# Patient Record
Sex: FEMALE | Race: BLACK OR AFRICAN AMERICAN | NOT HISPANIC OR LATINO | Employment: UNEMPLOYED | URBAN - METROPOLITAN AREA
[De-identification: names, ages, dates, MRNs, and addresses within clinical notes are randomized per-mention and may not be internally consistent; named-entity substitution may affect disease eponyms.]

---

## 2023-02-27 LAB
BASOPHILS (10*3/UL) IN BLOOD BY AUTOMATED COUNT: 0.01 X10E9/L (ref 0–0.1)
BASOPHILS/100 LEUKOCYTES IN BLOOD BY AUTOMATED COUNT: 0.3 % (ref 0–2)
EOSINOPHILS (10*3/UL) IN BLOOD BY AUTOMATED COUNT: 0.05 X10E9/L (ref 0–0.7)
EOSINOPHILS/100 LEUKOCYTES IN BLOOD BY AUTOMATED COUNT: 1.4 % (ref 0–6)
ERYTHROCYTE DISTRIBUTION WIDTH (RATIO) BY AUTOMATED COUNT: 13.1 % (ref 11.5–14.5)
ERYTHROCYTE MEAN CORPUSCULAR HEMOGLOBIN CONCENTRATION (G/DL) BY AUTOMATED: 31.7 G/DL (ref 32–36)
ERYTHROCYTE MEAN CORPUSCULAR VOLUME (FL) BY AUTOMATED COUNT: 93 FL (ref 80–100)
ERYTHROCYTES (10*6/UL) IN BLOOD BY AUTOMATED COUNT: 4.83 X10E12/L (ref 4–5.2)
HEMATOCRIT (%) IN BLOOD BY AUTOMATED COUNT: 44.8 % (ref 36–46)
HEMOGLOBIN (G/DL) IN BLOOD: 14.2 G/DL (ref 12–16)
IGA (MG/DL) IN SER/PLAS: 436 MG/DL (ref 70–400)
IGG (MG/DL) IN SER/PLAS: 1510 MG/DL (ref 700–1600)
IGM (MG/DL) IN SER/PLAS: 41 MG/DL (ref 40–230)
IMMATURE GRANULOCYTES/100 LEUKOCYTES IN BLOOD BY AUTOMATED COUNT: 0.3 % (ref 0–0.9)
LEUKOCYTES (10*3/UL) IN BLOOD BY AUTOMATED COUNT: 3.5 X10E9/L (ref 4.4–11.3)
LYMPHOCYTES (10*3/UL) IN BLOOD BY AUTOMATED COUNT: 1.02 X10E9/L (ref 1.2–4.8)
LYMPHOCYTES/100 LEUKOCYTES IN BLOOD BY AUTOMATED COUNT: 29.3 % (ref 13–44)
MONOCYTES (10*3/UL) IN BLOOD BY AUTOMATED COUNT: 0.47 X10E9/L (ref 0.1–1)
MONOCYTES/100 LEUKOCYTES IN BLOOD BY AUTOMATED COUNT: 13.5 % (ref 2–10)
NEUTROPHILS (10*3/UL) IN BLOOD BY AUTOMATED COUNT: 1.92 X10E9/L (ref 1.2–7.7)
NEUTROPHILS/100 LEUKOCYTES IN BLOOD BY AUTOMATED COUNT: 55.2 % (ref 40–80)
NRBC (PER 100 WBCS) BY AUTOMATED COUNT: 0 /100 WBC (ref 0–0)
PLATELETS (10*3/UL) IN BLOOD AUTOMATED COUNT: 201 X10E9/L (ref 150–450)

## 2023-03-03 LAB
CD19 ABSOLUTE: 0.03 X10E9/L (ref 0.07–0.91)
CD19%: 3 % (ref 6–19)
CD19+CD24++CD38++%: 33.9 % (ref 1–3.6)
CD19+CD24-CD38++%: 8.4 % (ref 0.6–1.6)
CD19+CD27+IGD+%: 1.4 % (ref 13.4–21.4)
CD19+CD27+IGD-%: 9.7 % (ref 9.2–18.9)
CD19+CD27-IGD+%: 87.9 % (ref 58–72.1)
CD45%: 100 %
FMETH: ABNORMAL
FSIT1: ABNORMAL
MARKER INTERPRETATION: ABNORMAL
PV191: NORMAL

## 2023-08-28 LAB
BASOPHILS (10*3/UL) IN BLOOD BY AUTOMATED COUNT: 0.04 X10E9/L (ref 0–0.1)
BASOPHILS/100 LEUKOCYTES IN BLOOD BY AUTOMATED COUNT: 1.5 % (ref 0–2)
EOSINOPHILS (10*3/UL) IN BLOOD BY AUTOMATED COUNT: 0.07 X10E9/L (ref 0–0.7)
EOSINOPHILS/100 LEUKOCYTES IN BLOOD BY AUTOMATED COUNT: 2.7 % (ref 0–6)
ERYTHROCYTE DISTRIBUTION WIDTH (RATIO) BY AUTOMATED COUNT: 12.6 % (ref 11.5–14.5)
ERYTHROCYTE MEAN CORPUSCULAR HEMOGLOBIN CONCENTRATION (G/DL) BY AUTOMATED: 31.7 G/DL (ref 32–36)
ERYTHROCYTE MEAN CORPUSCULAR VOLUME (FL) BY AUTOMATED COUNT: 96 FL (ref 80–100)
ERYTHROCYTES (10*6/UL) IN BLOOD BY AUTOMATED COUNT: 4.33 X10E12/L (ref 4–5.2)
HEMATOCRIT (%) IN BLOOD BY AUTOMATED COUNT: 41.7 % (ref 36–46)
HEMOGLOBIN (G/DL) IN BLOOD: 13.2 G/DL (ref 12–16)
IGA (MG/DL) IN SER/PLAS: 455 MG/DL (ref 70–400)
IGG (MG/DL) IN SER/PLAS: 1810 MG/DL (ref 700–1600)
IGM (MG/DL) IN SER/PLAS: 32 MG/DL (ref 40–230)
IMMATURE GRANULOCYTES/100 LEUKOCYTES IN BLOOD BY AUTOMATED COUNT: 0 % (ref 0–0.9)
LEUKOCYTES (10*3/UL) IN BLOOD BY AUTOMATED COUNT: 2.6 X10E9/L (ref 4.4–11.3)
LYMPHOCYTES (10*3/UL) IN BLOOD BY AUTOMATED COUNT: 0.83 X10E9/L (ref 1.2–4.8)
LYMPHOCYTES/100 LEUKOCYTES IN BLOOD BY AUTOMATED COUNT: 31.9 % (ref 13–44)
MONOCYTES (10*3/UL) IN BLOOD BY AUTOMATED COUNT: 0.44 X10E9/L (ref 0.1–1)
MONOCYTES/100 LEUKOCYTES IN BLOOD BY AUTOMATED COUNT: 16.9 % (ref 2–10)
NEUTROPHILS (10*3/UL) IN BLOOD BY AUTOMATED COUNT: 1.22 X10E9/L (ref 1.2–7.7)
NEUTROPHILS/100 LEUKOCYTES IN BLOOD BY AUTOMATED COUNT: 47 % (ref 40–80)
NRBC (PER 100 WBCS) BY AUTOMATED COUNT: 0 /100 WBC (ref 0–0)
PLATELETS (10*3/UL) IN BLOOD AUTOMATED COUNT: 200 X10E9/L (ref 150–450)

## 2023-08-30 LAB
CD19 ABSOLUTE: 0.03 X10E9/L (ref 0.07–0.91)
CD19%: 4 % (ref 6–19)
CD19+CD24++CD38++%: 27.8 % (ref 1–3.6)
CD19+CD24-CD38++%: 9.3 % (ref 0.6–1.6)
CD19+CD27+IGD+%: 0.6 % (ref 13.4–21.4)
CD19+CD27+IGD-%: 12.4 % (ref 9.2–18.9)
CD19+CD27-IGD+%: 84.9 % (ref 58–72.1)
CD45%: 100 %
FMETH: ABNORMAL
FSIT1: ABNORMAL
MARKER INTERPRETATION: ABNORMAL
PV191: NORMAL

## 2024-01-08 DIAGNOSIS — G35 MULTIPLE SCLEROSIS (MULTI): Primary | ICD-10-CM

## 2024-01-08 DIAGNOSIS — Z79.899 DRUG-INDUCED IMMUNODEFICIENCY (MULTI): ICD-10-CM

## 2024-01-08 DIAGNOSIS — D84.821 DRUG-INDUCED IMMUNODEFICIENCY (MULTI): ICD-10-CM

## 2024-01-08 RX ORDER — ALBUTEROL SULFATE 0.83 MG/ML
3 SOLUTION RESPIRATORY (INHALATION) AS NEEDED
Status: CANCELLED | OUTPATIENT
Start: 2024-02-28

## 2024-01-08 RX ORDER — DIPHENHYDRAMINE HYDROCHLORIDE 50 MG/ML
50 INJECTION INTRAMUSCULAR; INTRAVENOUS AS NEEDED
Status: CANCELLED | OUTPATIENT
Start: 2024-02-28

## 2024-01-08 RX ORDER — EPINEPHRINE 0.3 MG/.3ML
0.3 INJECTION SUBCUTANEOUS EVERY 5 MIN PRN
Status: CANCELLED | OUTPATIENT
Start: 2024-02-28

## 2024-01-08 RX ORDER — FAMOTIDINE 10 MG/ML
20 INJECTION INTRAVENOUS ONCE AS NEEDED
Status: CANCELLED | OUTPATIENT
Start: 2024-02-28

## 2024-01-08 RX ORDER — DIPHENHYDRAMINE HYDROCHLORIDE 50 MG/ML
25 INJECTION INTRAMUSCULAR; INTRAVENOUS ONCE
Status: CANCELLED | OUTPATIENT
Start: 2024-02-28

## 2024-01-08 RX ORDER — ACETAMINOPHEN 325 MG/1
650 TABLET ORAL ONCE
Status: CANCELLED | OUTPATIENT
Start: 2024-02-28

## 2024-02-15 ENCOUNTER — TELEPHONE (OUTPATIENT)
Dept: HOME HEALTH SERVICES | Facility: HOME HEALTH | Age: 64
End: 2024-02-15

## 2024-02-15 ENCOUNTER — DOCUMENTATION (OUTPATIENT)
Dept: HOME HEALTH SERVICES | Facility: HOME HEALTH | Age: 64
End: 2024-02-15

## 2024-02-15 ENCOUNTER — LAB (OUTPATIENT)
Dept: LAB | Facility: LAB | Age: 64
End: 2024-02-15
Payer: MEDICARE

## 2024-02-15 ENCOUNTER — HOME HEALTH ADMISSION (OUTPATIENT)
Dept: HOME HEALTH SERVICES | Facility: HOME HEALTH | Age: 64
End: 2024-02-15
Payer: MEDICARE

## 2024-02-15 ENCOUNTER — OFFICE VISIT (OUTPATIENT)
Dept: NEUROLOGY | Facility: HOSPITAL | Age: 64
End: 2024-02-15
Payer: MEDICARE

## 2024-02-15 VITALS
TEMPERATURE: 96.6 F | HEART RATE: 81 BPM | WEIGHT: 103 LBS | SYSTOLIC BLOOD PRESSURE: 152 MMHG | HEIGHT: 63 IN | BODY MASS INDEX: 18.25 KG/M2 | DIASTOLIC BLOOD PRESSURE: 86 MMHG | RESPIRATION RATE: 18 BRPM

## 2024-02-15 DIAGNOSIS — Z79.899 DRUG-INDUCED IMMUNODEFICIENCY (MULTI): ICD-10-CM

## 2024-02-15 DIAGNOSIS — G35 MULTIPLE SCLEROSIS (MULTI): ICD-10-CM

## 2024-02-15 DIAGNOSIS — D84.821 DRUG-INDUCED IMMUNODEFICIENCY (MULTI): ICD-10-CM

## 2024-02-15 DIAGNOSIS — G35 MULTIPLE SCLEROSIS (MULTI): Primary | ICD-10-CM

## 2024-02-15 LAB
ALBUMIN SERPL BCP-MCNC: 4 G/DL (ref 3.4–5)
ALP SERPL-CCNC: 60 U/L (ref 33–136)
ALT SERPL W P-5'-P-CCNC: 17 U/L (ref 7–45)
ANION GAP SERPL CALC-SCNC: 15 MMOL/L (ref 10–20)
AST SERPL W P-5'-P-CCNC: 21 U/L (ref 9–39)
BASOPHILS # BLD AUTO: 0.03 X10*3/UL (ref 0–0.1)
BASOPHILS NFR BLD AUTO: 0.7 %
BILIRUB SERPL-MCNC: 0.4 MG/DL (ref 0–1.2)
BUN SERPL-MCNC: 15 MG/DL (ref 6–23)
CALCIUM SERPL-MCNC: 9.9 MG/DL (ref 8.6–10.6)
CHLORIDE SERPL-SCNC: 103 MMOL/L (ref 98–107)
CO2 SERPL-SCNC: 28 MMOL/L (ref 21–32)
CREAT SERPL-MCNC: 0.67 MG/DL (ref 0.5–1.05)
EGFRCR SERPLBLD CKD-EPI 2021: >90 ML/MIN/1.73M*2
EOSINOPHIL # BLD AUTO: 0.06 X10*3/UL (ref 0–0.7)
EOSINOPHIL NFR BLD AUTO: 1.4 %
ERYTHROCYTE [DISTWIDTH] IN BLOOD BY AUTOMATED COUNT: 13.2 % (ref 11.5–14.5)
GLUCOSE SERPL-MCNC: 87 MG/DL (ref 74–99)
HCT VFR BLD AUTO: 43 % (ref 36–46)
HGB BLD-MCNC: 13.9 G/DL (ref 12–16)
IGA SERPL-MCNC: 595 MG/DL (ref 70–400)
IGG SERPL-MCNC: 1770 MG/DL (ref 700–1600)
IGM SERPL-MCNC: 42 MG/DL (ref 40–230)
IMM GRANULOCYTES # BLD AUTO: 0.01 X10*3/UL (ref 0–0.7)
IMM GRANULOCYTES NFR BLD AUTO: 0.2 % (ref 0–0.9)
LYMPHOCYTES # BLD AUTO: 1.18 X10*3/UL (ref 1.2–4.8)
LYMPHOCYTES NFR BLD AUTO: 26.9 %
MCH RBC QN AUTO: 29.8 PG (ref 26–34)
MCHC RBC AUTO-ENTMCNC: 32.3 G/DL (ref 32–36)
MCV RBC AUTO: 92 FL (ref 80–100)
MONOCYTES # BLD AUTO: 0.57 X10*3/UL (ref 0.1–1)
MONOCYTES NFR BLD AUTO: 13 %
NEUTROPHILS # BLD AUTO: 2.54 X10*3/UL (ref 1.2–7.7)
NEUTROPHILS NFR BLD AUTO: 57.8 %
NRBC BLD-RTO: 0 /100 WBCS (ref 0–0)
PLATELET # BLD AUTO: 275 X10*3/UL (ref 150–450)
POTASSIUM SERPL-SCNC: 4.5 MMOL/L (ref 3.5–5.3)
PROT SERPL-MCNC: 8.1 G/DL (ref 6.4–8.2)
RBC # BLD AUTO: 4.66 X10*6/UL (ref 4–5.2)
SODIUM SERPL-SCNC: 141 MMOL/L (ref 136–145)
WBC # BLD AUTO: 4.4 X10*3/UL (ref 4.4–11.3)

## 2024-02-15 PROCEDURE — 1159F MED LIST DOCD IN RCRD: CPT | Performed by: PSYCHIATRY & NEUROLOGY

## 2024-02-15 PROCEDURE — 99215 OFFICE O/P EST HI 40 MIN: CPT | Performed by: PSYCHIATRY & NEUROLOGY

## 2024-02-15 PROCEDURE — RXMED WILLOW AMBULATORY MEDICATION CHARGE

## 2024-02-15 PROCEDURE — 82784 ASSAY IGA/IGD/IGG/IGM EACH: CPT

## 2024-02-15 PROCEDURE — 99215 OFFICE O/P EST HI 40 MIN: CPT | Mod: 27 | Performed by: PSYCHIATRY & NEUROLOGY

## 2024-02-15 PROCEDURE — 85025 COMPLETE CBC W/AUTO DIFF WBC: CPT

## 2024-02-15 PROCEDURE — 80053 COMPREHEN METABOLIC PANEL: CPT

## 2024-02-15 PROCEDURE — 36415 COLL VENOUS BLD VENIPUNCTURE: CPT

## 2024-02-15 PROCEDURE — 1036F TOBACCO NON-USER: CPT | Performed by: PSYCHIATRY & NEUROLOGY

## 2024-02-15 PROCEDURE — 1126F AMNT PAIN NOTED NONE PRSNT: CPT | Performed by: PSYCHIATRY & NEUROLOGY

## 2024-02-15 RX ORDER — IBUPROFEN 200 MG
1 TABLET ORAL 2 TIMES DAILY
COMMUNITY
Start: 2022-05-16

## 2024-02-15 RX ORDER — MIRTAZAPINE 7.5 MG/1
7.5 TABLET, FILM COATED ORAL NIGHTLY
COMMUNITY
Start: 2024-02-02 | End: 2024-06-01

## 2024-02-15 RX ORDER — GABAPENTIN 300 MG/1
1 CAPSULE ORAL 2 TIMES DAILY
COMMUNITY
Start: 2023-12-28 | End: 2024-06-07 | Stop reason: ALTCHOICE

## 2024-02-15 RX ORDER — PANTOPRAZOLE SODIUM 20 MG/1
20 TABLET, DELAYED RELEASE ORAL
COMMUNITY
Start: 2023-08-01

## 2024-02-15 RX ORDER — TIZANIDINE 2 MG/1
2 TABLET ORAL DAILY
Qty: 90 TABLET | Refills: 3 | Status: SHIPPED | OUTPATIENT
Start: 2024-02-15 | End: 2025-02-09

## 2024-02-15 RX ORDER — ACETAMINOPHEN 500 MG
500 TABLET ORAL
COMMUNITY
Start: 2022-05-16

## 2024-02-15 ASSESSMENT — PAIN SCALES - GENERAL: PAINLEVEL: 0-NO PAIN

## 2024-02-15 NOTE — TELEPHONE ENCOUNTER
Thank you for your referral for this patient.  Due to staffing constraints we will have a delay in the start of care until 2/20/2024.  If your patient requires services sooner than that date please refer to another agency.  We will process this referral for the services your ordered unless we hear from you in regard to this referral.    Thank you ,  [facility] Intake Team

## 2024-02-15 NOTE — PROGRESS NOTES
ADDENDUM 2/15/24: Patient will be discontinuing Ocrevus and transitioning to a new DMT. Ocrevus orders and therapy plan will be discontinued.    Wilner Galloway, PharmD, BCPS, [facility] [place]  Clinical Pharmacy Specialist- Neurology/MS  [facility]  Phone: [phone]  Fax: [phone]  02/15/24  2:19 PM

## 2024-02-15 NOTE — PATIENT INSTRUCTIONS
I'm sorry you had yet another infection requiring hospitalization. I still think that the benefits of ocrevus outweigh the risks. However, I do understand that you no longer want to be on ocrevus. I will start the process to get you on another medication called zeposia that is taken as an oral pill once a day.     Please answer your phone in the next few days as we try to arrange for your medication.     I will order an EKG and blood work to prepare for your zeposia switch.     Please continue the same dose of vitamin D    Please restart tizanidine 2 mg at night.     Repeat brain MRI in August then follow up again with me.     I will order home PT for you.     Thank you for visiting the clinic today    Cooper Escalona MD

## 2024-02-15 NOTE — HH CARE COORDINATION
Home Care received a Referral for Physical Therapy. We have processed the referral for a Start of Care on 2/20/2024    If you have any questions or concerns, please feel free to contact us at [phone]. Follow the prompts, enter your five digit zip code, and you will be directed to your care team on [facility] 3.

## 2024-02-15 NOTE — PROGRESS NOTES
Chief Complaint  MS   Neurologic Evaluation.      History of Present Illness     Neuro - Immunology   Date of onset: 2014   Date of diagnosis: 05/03/2022   Last MRI brain: 8/2023   Last MRI cervical: 1/2023   Last MRI thoracic: 1/2023   Last CBC (Complete Blood Count): 2/2023~   SPMS with progression.      Disease Course at Onset: RR.   Disease Course Now: progressive without relapses.   Current DMT (Disease Modifying Therapies): Ocrevus (since 8/2022).   Previous DMT (Disease Modifying Therapies): none.   CSF (Cerebrospinal Fluid): not done.   JCV (Mejia Cunningham Virus): Positive 5/2022, index 3.2.   VZV (Varicella Zoster Virus): Positive 5/2022.   Hep Panel: negative 5/2022.   NMO (Neuromyelitis Optica): negative 12/2021.   MOG (Myelin Oligodendrocyte Glycoprotein): negative 2/2021.     Narrative HPI:   This is a 63 yo left handed AAF with hx of brief remote smoking who presents for MS follow up:     Interval hx:  Had another hospital admission for a perirectal abscess in November. She also felt weaker after her latest ocrevus infusion. She no longer wants to take ocrevus because of the recurrent infections. She wants to try something with less infection risk. Notably, her last IGG level in August was normal (1810) and last ALC was also normal (1.05). she reports left knee pain that responded to steroid a local injection.       MS Symptom Review:   Weakness:~acute BLE weakness in 2014 followed by progressive weakness since 2018.   Sensory changes:~BLE tingling.   Incoordination:   Falling:~once every 3 months because of tripping over the left foot.   Gait Change:~using a cane since 2018.   No painful vision loss.   No double vision.   Vertigo:~positional only resolved with Ebly.   No facial/bulbar weakness.~but had an episode of severe pain in the left cheek and inability to move the jaw to talk or eat thought to be TMJ.   No Lhermitte's.   Bladder/bowel dysfunction:~urgency with occasional urge incontinence.    Spasticity:~yes legs feel stiff.   No tonic spasms.   Tremors:~yes left hand, action tremor.   RLS (Restless Leg Syndrome):~yes both legs since 2021.   Dystonia:~bilateral food dystonia increased arching of the foot with inversion, both feet do not get affected simultaneously, in the morning, 2-3 times a week, painful, lasting for a few seconds.   Other movement disorders:~R>L leg jerking, painless, rare, no triggers, no impact.   No heat sensitivity.   Fatigue:   No depression.   Anxiety:   No cognitive changes.   DMT (Disease Modifying Therapies):~Ocrevus since 8/2022. Causing recurrent infections.   Vitamin D:~VitD 19480 units weekly.   Symptomatic:~Myrbetriq for urinary symptoms.      Review of Systems  All systems reviewed and are negative except as mentioned in the HPI.        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)   · Ataxia (781.3) (R27.0)   · Cervical myelopathy (721.1) (G95.9)   · Dizziness (780.4) (R42)   · Foot pain, bilateral (729.5) (M79.671,M79.672)   · Immunosuppression due to drug therapy (V58.69) (D84.821,Z79.899)   · Impacted cerumen of both ears (380.4) (H61.23)   · Metatarsalgia of both feet (726.70) (M77.41,M77.42)   · Multiple sclerosis, secondary progressive (340) (G35)   · Otalgia, bilateral (388.70) (H92.03)   · Pain in both feet (729.5) (M79.671,M79.672)   · Paresthesia of both feet (782.0) (R20.2)   · Positive GAURI (antinuclear antibody) (795.79) (R76.8)   · Positive colorectal cancer screening using Cologuard test (787.7) (R19.5)   · Screening for cervical cancer (V76.2) (Z12.4)   · Sensation of fullness in both ears (388.8) (H93.8X3)   · Sensory urge incontinence (788.31) (N39.41)   · Subjective tinnitus, bilateral (388.31) (H93.13)   · TMJ pain dysfunction syndrome (524.69) (M26.629)   · Unsteadiness on feet (781.2) (R26.81)   · Urinary incontinence (788.30) (R32)   · Weakness of extremity (729.89) (R29.898)   · Well woman exam (V72.31) (Z01.419)     Past  Medical History  Problems    · Multiple sclerosis, secondary progressive (340) (G35)   · Sensory urge incontinence (788.31) (N39.41)     Surgical History  Problems    · History of Arm surgery   · History of Oral surgery   · History of Tubal ligation     Family History  Mother    · Family history of rheumatoid arthritis (V17.7) (Z82.61)     Social History  Problems    · Always uses seat belt   · Former smoker (V15.82) (Z87.891)   · No alcohol use     Allergies  Medication    · Penicillins   Hives; burning sensation; Itching; Updated By: Shahab Bay; 5/16/2022 9:27:57 AM   · sulfa   Hives; burning sensation; Itching; Updated By: Shahab Bay; 5/16/2022 9:27:57 AM   · Benadryl CREA   skin blistered up; Recorded By: Shahab Bay; 5/17/2022 9:23:44 AM   · Cipro   Nausea; Updated By: Shahab Bay; 5/16/2022 9:27:57 AM   · codeine   Nausea; Updated By: Shahab Bay; 5/16/2022 9:27:57 AM   · Flagyl   Nausea; Updated By: Shahab Bay; 5/16/2022 9:27:57 AM          Physical Exam  Multiple sclerosis Functional Composite (MSFC):     25 foot walk: 14.6 s compared to 14.8 s compared to 16.8 s compared to 20 s compared to 24 s     9-PEG-HOLE:      Right not done   Left not done      PST test: not done   Level of education:  Raw Score:  Adjusted Score                                                                                                       Estimated EDSS:     Constitutional: General appearance: no acute distress   Mental status: The patient was in no distress, alert, interactive and cooperative. Affect is appropriate.   Orientation: oriented to person,~oriented to place~and~oriented to time.   Memory: recent memory intact~and~remote memory intact.   Attention: normal attention span~and~normal concentrating ability.   Language: normal comprehension~and~no difficulty naming common objects.   Fund of knowledge: Patient displays adequate knowledge of current events,~adequate fund of knowledge regarding past  history~and~adequate fund of knowledge regarding vocabulary.   Cranial nerve II: Visual fields full to confrontation.~red desaturation left eye. no RAPD but left pupil sluggish in reaction.   Cranial nerves III, IV, and VI: Pupils round, equally reactive to light; no ptosis. EOMs intact. No nystagmus.~no Daren.   Cranial Nerve V: Facial sensation intact bilaterally.   Cranial nerve VII: Normal and symmetric facial strength.   Cranial nerve VIII: Hearing is intact bilaterally to finger rub / whisper.   Cranial nerves IX and X: Palate elevates symmetrically.   Cranial nerve XI: Shoulder shrug and neck rotation strength are intact.   Cranial nerve XII: Tongue midline with normal strength.   Motor: Muscle bulk was normal in both upper and lower extremities.~moderate spasticity BLE more on the left.~3-4/5 LLE, 4+/5 RLE.~moderate postural/action tremor of the left hand.   Deep Tendon Reflexes: left biceps 3+,~right biceps 3+,~left triceps 2+,~right triceps 2+,~left brachioradialis 3+,~right brachioradialis 3+,~left patella 4+,~right patella 4+,~left ankle jerk 3+,~right ankle jerk 3+   Sensory Exam:. left hemicord syndrome and distal reduction of vibration sense.   Coordination: There is no limb dystaxia and rapid alternating movements are intact.   Gait:. left cirumduction with a walker.      Scores and Scales     Pyramidal Functions: (3) Mild to moderate paraparesis or hemiparesis (detectable weakness but most function sustained for short periods, fatigue a problem); severe monoparesis (almost no function).   Cerebellar Functions: (3) Moderate truncal or limb ataxia (tremor or clumsy movements interfere with function in all spheres).   Brainstem Functions: (0) Normal.      Sensory Function: Moderate decrease in touch or pain or position sense, and/or moderate decrease in vibration in one or two limbs; or vibratory (c/s figure writing) decrease alone in three or four limbs.                     Bowel and Bladder Function:  (3) Frequent urinary incontinence.      Visual Function: (0) Normal.     Provider Impressions     Neuro - Immunology Assessment: This is a 63 year old AA,~left - handed female, with PMH of: remote brief smoking who presents: with acute transient BLE weakness in 2014 followed by gradual and progressive BLE weakness with gait and bladder dysfunction since 2018.   The Neurological Exam showed: BLE spastic paraparesis, LUE postural/action tremor, sluggish left pupile, and red desaturation OS.   MRI Showed: non-enhancing typical demyelinating plaques in periventricular and cervical locations with heaviest burden in the cervical cord.   CSF (Cerebrospinal Fluid): not done and not needed.   OCT/VEP: not done.   MS Mimics: ruled out including negative AQP4 and MOG.   The Overall Picture is Suggestive of: SPMS with progression and without clinical activity. I'm unable to  radiological activity in the absence of longitudinal scans.   DMT (Disease Modifying Therapies): Ocrevus (started in 8/2022).         Interval Assessment:   12/5/2022: Started Ocrevus in 8/2022. Tolerated first two doses well. Due for next dose in March 2023. No labs or MRI to review since last visit. Taking Vit D weekly. Started on Myrbetriq by urology for her urinary symptoms. Started using AFO for her dropfoot, which she says helps the foot clear the floor. Using a walker to ambulate. 25ft walk in 20s today, with walker. Moderate spasticity in her LEs on exam. will start baclofen. will move MRI to March     3/6/2023: Continues to tolerate Ocrevus well. First maintenance dose of Ocrevus was 2/27/23. Bcells at 3%, will monitor for early repopulation in future. Wasnt able to tolerate baclofen due to LE weakness and drowsiness. Presented to the ED in 1/23 with LE neuropathic pain. Was given 7 days of gabapentin, which was helpful. Had MRI brain/spine in ED, which was unremarkable for new or active lesions but showed multiple disc prolapse on lumbar MRI.  Neuropathic pain has since resolved without gabapentin. Still having some spasticity in both her LE so will try tizanidine. Hasnt been using her AFO recently. 25ft walk of 16.8s improved from last visit.     The patient is presenting with left foot drop and left knee genu recurvatum, leading to increased fall risk and increased energy expenditure during functional mobility. The patient is ambulatory and will benefit from utilization of an AFO on the left side at all times during ambulation to increase foot clearance and reduce fall risk during ambulation.     08/17/2023: No new neurological symptoms. had two back to back UTIs in June and July. The last UTI required hospital admission for one day. she hasn't been taking her myrbetriq regularly. New MRI without any new or active lesions. 25-F-W keeps improving. will have her follow up with urology given recurrent UTIs. she hasn't started tizanidine yet and should start now.     2/15/2024: Had another hospital admission for a perirectal abscess in November. She also felt weaker after her latest ocrevus infusion. She no longer wants to take ocrevus because of the recurrent infections. She wants to try something with less infection risk. Notably, her last IGG level in August was normal (1810) and last ALC was also normal (1.05). she reports left knee pain that responded to a local steroid injection. I discussed that all other DMTs may also increase the risk of infection although possibly less so than ocrevus. After a prolonged discussion, we mutually agreed to switch to zeposia after EKG and updated blood work. Had some benefit from tizanidine.         Plan: SPMS was discussed with the patient (and family if present) in detail including pathogenesis, clinical picture, complications, course, prognosis, monitoring strategy, and management options. All questions answered.   Acute Relapse Management: not indicated.   DMT (Disease Modifying Therapies): will cancel her next  ocrevus infusion and start her on zeposia instead per her wishes.  Will Order Blood Work For: CBC diff, CMP, and IgG  Will Order MRI of: the brain WWO to be done in August 2024 for monitoring.   Symptomatic Management: restart tizanidine. continue gabapentin. may need ampyra in future.   Vitamin D: continue 37139 units weekly.   Smoking: non smoker.   PT/OT: continue.   Instructions: Keep an active lifestyle and develop exercise routine as tolerated. Recommend a balanced healthy diet. Avoid excessive amounts of salt, carbohydrates, fat, and red meat. Increase intake of fruits, vegetables, and white meat.   Follow-Up: in August after next MRI.     Cooper Escalona MD

## 2024-02-16 ENCOUNTER — PHARMACY VISIT (OUTPATIENT)
Dept: PHARMACY | Facility: CLINIC | Age: 64
End: 2024-02-16
Payer: COMMERCIAL

## 2024-02-16 ENCOUNTER — HOSPITAL ENCOUNTER (OUTPATIENT)
Dept: CARDIOLOGY | Facility: HOSPITAL | Age: 64
Discharge: HOME | End: 2024-02-16
Payer: MEDICARE

## 2024-02-16 DIAGNOSIS — G35 MULTIPLE SCLEROSIS (MULTI): ICD-10-CM

## 2024-02-16 PROCEDURE — 93010 ELECTROCARDIOGRAM REPORT: CPT | Performed by: INTERNAL MEDICINE

## 2024-02-16 PROCEDURE — 93005 ELECTROCARDIOGRAM TRACING: CPT

## 2024-02-20 ENCOUNTER — HOME CARE VISIT (OUTPATIENT)
Dept: HOME HEALTH SERVICES | Facility: HOME HEALTH | Age: 64
End: 2024-02-20
Payer: MEDICARE

## 2024-02-20 PROCEDURE — G0151 HHCP-SERV OF PT,EA 15 MIN: HCPCS | Mod: HHH

## 2024-02-20 PROCEDURE — 0023 HH SOC

## 2024-02-20 PROCEDURE — 169592 NO-PAY CLAIM PROCEDURE

## 2024-02-20 PROCEDURE — 1090000002 HH PPS REVENUE DEBIT

## 2024-02-20 PROCEDURE — 1090000001 HH PPS REVENUE CREDIT

## 2024-02-21 VITALS
BODY MASS INDEX: 17.68 KG/M2 | HEIGHT: 64 IN | OXYGEN SATURATION: 98 % | SYSTOLIC BLOOD PRESSURE: 120 MMHG | HEART RATE: 66 BPM | DIASTOLIC BLOOD PRESSURE: 82 MMHG | RESPIRATION RATE: 14 BRPM | TEMPERATURE: 98.6 F

## 2024-02-21 LAB
ATRIAL RATE: 73 BPM
P AXIS: 82 DEGREES
P OFFSET: 221 MS
P ONSET: 174 MS
PR INTERVAL: 110 MS
Q ONSET: 229 MS
QRS COUNT: 12 BEATS
QRS DURATION: 72 MS
QT INTERVAL: 376 MS
QTC CALCULATION(BAZETT): 414 MS
QTC FREDERICIA: 401 MS
R AXIS: 73 DEGREES
T AXIS: 70 DEGREES
T OFFSET: 417 MS
VENTRICULAR RATE: 73 BPM

## 2024-02-21 PROCEDURE — 1090000002 HH PPS REVENUE DEBIT

## 2024-02-21 PROCEDURE — 1090000001 HH PPS REVENUE CREDIT

## 2024-02-21 SDOH — HEALTH STABILITY: PHYSICAL HEALTH: EXERCISE TYPE: NOT PRESENTLY

## 2024-02-21 ASSESSMENT — ENCOUNTER SYMPTOMS
OCCASIONAL FEELINGS OF UNSTEADINESS: 1
PAIN LOCATION - PAIN SEVERITY: 3/10
PAIN LOCATION: RIGHT LEG
PAIN SEVERITY GOAL: 0/10
PAIN: 1
MUSCLE WEAKNESS: 1
LOWEST PAIN SEVERITY IN PAST 24 HOURS: 2/10
PAIN LOCATION - PAIN FREQUENCY: INTERMITTENT
SUBJECTIVE PAIN PROGRESSION: WAXING AND WANING
PAIN LOCATION: LEFT LEG
PERSON REPORTING PAIN: PATIENT
PAIN LOCATION - PAIN QUALITY: STIFFNESS
HIGHEST PAIN SEVERITY IN PAST 24 HOURS: 3/10
LOSS OF SENSATION IN FEET: 1
DEPRESSION: 0

## 2024-02-21 ASSESSMENT — ACTIVITIES OF DAILY LIVING (ADL)
AMBULATION_DISTANCE/DURATION_TOLERATED: ABNORMAL PATTERN
AMBULATION ASSISTANCE ON FLAT SURFACES: 1
OASIS_M1830: 04
ENTERING_EXITING_HOME: SUPERVISION

## 2024-02-21 ASSESSMENT — PAIN SCALES - PAIN ASSESSMENT IN ADVANCED DEMENTIA (PAINAD)
NEGVOCALIZATION: 1 - OCCASIONAL MOAN OR GROAN. LOW-LEVEL SPEECH WITH A NEGATIVE OR DISAPPROVING QUALITY.
FACIALEXPRESSION: 0
CONSOLABILITY: 0
FACIALEXPRESSION: 0 - SMILING OR INEXPRESSIVE.
BREATHING: 0
BODYLANGUAGE: 0 - RELAXED.
CONSOLABILITY: 0 - NO NEED TO CONSOLE.
TOTALSCORE: 1
BODYLANGUAGE: 0
NEGVOCALIZATION: 1

## 2024-02-22 DIAGNOSIS — G35 MULTIPLE SCLEROSIS (MULTI): Primary | ICD-10-CM

## 2024-02-22 PROCEDURE — 1090000002 HH PPS REVENUE DEBIT

## 2024-02-22 PROCEDURE — 1090000001 HH PPS REVENUE CREDIT

## 2024-02-23 PROCEDURE — 1090000001 HH PPS REVENUE CREDIT

## 2024-02-23 PROCEDURE — 1090000002 HH PPS REVENUE DEBIT

## 2024-02-24 PROCEDURE — 1090000002 HH PPS REVENUE DEBIT

## 2024-02-24 PROCEDURE — 1090000001 HH PPS REVENUE CREDIT

## 2024-02-25 PROCEDURE — 1090000001 HH PPS REVENUE CREDIT

## 2024-02-25 PROCEDURE — 1090000002 HH PPS REVENUE DEBIT

## 2024-02-26 PROCEDURE — 1090000002 HH PPS REVENUE DEBIT

## 2024-02-26 PROCEDURE — 1090000001 HH PPS REVENUE CREDIT

## 2024-02-27 ENCOUNTER — HOME CARE VISIT (OUTPATIENT)
Dept: HOME HEALTH SERVICES | Facility: HOME HEALTH | Age: 64
End: 2024-02-27
Payer: MEDICARE

## 2024-02-27 VITALS — HEART RATE: 72 BPM | TEMPERATURE: 98.6 F | RESPIRATION RATE: 14 BRPM

## 2024-02-27 PROCEDURE — G0151 HHCP-SERV OF PT,EA 15 MIN: HCPCS | Mod: HHH

## 2024-02-27 PROCEDURE — 1090000001 HH PPS REVENUE CREDIT

## 2024-02-27 PROCEDURE — 1090000002 HH PPS REVENUE DEBIT

## 2024-02-27 SDOH — HEALTH STABILITY: PHYSICAL HEALTH: EXERCISE TYPE: FLOOR PROGRAM,DEVELOPMENTAL SEQUENCE

## 2024-02-27 ASSESSMENT — ENCOUNTER SYMPTOMS
PAIN LOCATION: LEFT KNEE
SUBJECTIVE PAIN PROGRESSION: GRADUALLY IMPROVING
PAIN: 1
OCCASIONAL FEELINGS OF UNSTEADINESS: 1
PAIN LOCATION - PAIN FREQUENCY: INTERMITTENT
DEPRESSION: 0
PAIN LOCATION - PAIN SEVERITY: 5/10
LOSS OF SENSATION IN FEET: 0
PAIN LOCATION - PAIN QUALITY: ACHING
PERSON REPORTING PAIN: PATIENT
LOWEST PAIN SEVERITY IN PAST 24 HOURS: 3/10
HIGHEST PAIN SEVERITY IN PAST 24 HOURS: 4/10

## 2024-02-27 ASSESSMENT — PAIN SCALES - PAIN ASSESSMENT IN ADVANCED DEMENTIA (PAINAD)
CONSOLABILITY: 0
NEGVOCALIZATION: 1
BODYLANGUAGE: 0
FACIALEXPRESSION: 2 - FACIAL GRIMACING.
BODYLANGUAGE: 0 - RELAXED.
NEGVOCALIZATION: 1 - OCCASIONAL MOAN OR GROAN. LOW-LEVEL SPEECH WITH A NEGATIVE OR DISAPPROVING QUALITY.
BREATHING: 0
TOTALSCORE: 3
FACIALEXPRESSION: 2
CONSOLABILITY: 0 - NO NEED TO CONSOLE.

## 2024-02-28 ENCOUNTER — HOME CARE VISIT (OUTPATIENT)
Dept: HOME HEALTH SERVICES | Facility: HOME HEALTH | Age: 64
End: 2024-02-28
Payer: MEDICARE

## 2024-02-28 ENCOUNTER — APPOINTMENT (OUTPATIENT)
Dept: INFUSION THERAPY | Facility: CLINIC | Age: 64
End: 2024-02-28
Payer: MEDICARE

## 2024-02-28 PROCEDURE — 1090000001 HH PPS REVENUE CREDIT

## 2024-02-28 PROCEDURE — 1090000002 HH PPS REVENUE DEBIT

## 2024-02-29 PROCEDURE — 1090000001 HH PPS REVENUE CREDIT

## 2024-02-29 PROCEDURE — 1090000002 HH PPS REVENUE DEBIT

## 2024-03-01 ENCOUNTER — HOME CARE VISIT (OUTPATIENT)
Dept: HOME HEALTH SERVICES | Facility: HOME HEALTH | Age: 64
End: 2024-03-01
Payer: MEDICARE

## 2024-03-01 VITALS — TEMPERATURE: 98.6 F | HEART RATE: 78 BPM | RESPIRATION RATE: 14 BRPM | OXYGEN SATURATION: 97 %

## 2024-03-01 PROCEDURE — 1090000001 HH PPS REVENUE CREDIT

## 2024-03-01 PROCEDURE — G0151 HHCP-SERV OF PT,EA 15 MIN: HCPCS | Mod: HHH

## 2024-03-01 PROCEDURE — 1090000002 HH PPS REVENUE DEBIT

## 2024-03-01 SDOH — HEALTH STABILITY: PHYSICAL HEALTH: EXERCISE TYPE: FLOOR PROGRAM,BALANCE PROGRAM,STRENGTHENING

## 2024-03-01 ASSESSMENT — ACTIVITIES OF DAILY LIVING (ADL)
AMBULATION_DISTANCE/DURATION_TOLERATED: ABNORMAL PATTERN
CURRENT_FUNCTION: STAND BY ASSIST
AMBULATION ASSISTANCE ON FLAT SURFACES: 1
AMBULATION ASSISTANCE: STAND BY ASSIST

## 2024-03-01 ASSESSMENT — ENCOUNTER SYMPTOMS
PERSON REPORTING PAIN: PATIENT
ARTHRALGIAS: 1
MUSCLE WEAKNESS: 1
PAIN SEVERITY GOAL: 0/10
LOWEST PAIN SEVERITY IN PAST 24 HOURS: 2/10
PAIN LOCATION: LEFT KNEE
SUBJECTIVE PAIN PROGRESSION: GRADUALLY IMPROVING
HIGHEST PAIN SEVERITY IN PAST 24 HOURS: 4/10
OCCASIONAL FEELINGS OF UNSTEADINESS: 0
PAIN: 1

## 2024-03-01 ASSESSMENT — PAIN SCALES - PAIN ASSESSMENT IN ADVANCED DEMENTIA (PAINAD)
NEGVOCALIZATION: 1 - OCCASIONAL MOAN OR GROAN. LOW-LEVEL SPEECH WITH A NEGATIVE OR DISAPPROVING QUALITY.
CONSOLABILITY: 0 - NO NEED TO CONSOLE.
NEGVOCALIZATION: 1
CONSOLABILITY: 0
BODYLANGUAGE: 0 - RELAXED.
FACIALEXPRESSION: 0
FACIALEXPRESSION: 0 - SMILING OR INEXPRESSIVE.
BREATHING: 0
TOTALSCORE: 1
BODYLANGUAGE: 0

## 2024-03-02 PROCEDURE — 1090000001 HH PPS REVENUE CREDIT

## 2024-03-02 PROCEDURE — 1090000002 HH PPS REVENUE DEBIT

## 2024-03-03 PROCEDURE — 1090000002 HH PPS REVENUE DEBIT

## 2024-03-03 PROCEDURE — 1090000001 HH PPS REVENUE CREDIT

## 2024-03-04 PROCEDURE — 1090000001 HH PPS REVENUE CREDIT

## 2024-03-04 PROCEDURE — 1090000002 HH PPS REVENUE DEBIT

## 2024-03-05 PROCEDURE — 1090000002 HH PPS REVENUE DEBIT

## 2024-03-05 PROCEDURE — 1090000001 HH PPS REVENUE CREDIT

## 2024-03-06 ENCOUNTER — SPECIALTY PHARMACY (OUTPATIENT)
Dept: PHARMACY | Facility: CLINIC | Age: 64
End: 2024-03-06

## 2024-03-06 ENCOUNTER — HOME CARE VISIT (OUTPATIENT)
Dept: HOME HEALTH SERVICES | Facility: HOME HEALTH | Age: 64
End: 2024-03-06
Payer: MEDICARE

## 2024-03-06 PROCEDURE — 1090000001 HH PPS REVENUE CREDIT

## 2024-03-06 PROCEDURE — RXMED WILLOW AMBULATORY MEDICATION CHARGE

## 2024-03-06 PROCEDURE — 1090000002 HH PPS REVENUE DEBIT

## 2024-03-06 PROCEDURE — G0151 HHCP-SERV OF PT,EA 15 MIN: HCPCS | Mod: HHH

## 2024-03-07 ENCOUNTER — PHARMACY VISIT (OUTPATIENT)
Dept: PHARMACY | Facility: CLINIC | Age: 64
End: 2024-03-07
Payer: COMMERCIAL

## 2024-03-07 VITALS — TEMPERATURE: 98.6 F | OXYGEN SATURATION: 98 % | RESPIRATION RATE: 14 BRPM | HEART RATE: 68 BPM

## 2024-03-07 PROCEDURE — 1090000001 HH PPS REVENUE CREDIT

## 2024-03-07 PROCEDURE — 1090000002 HH PPS REVENUE DEBIT

## 2024-03-07 SDOH — HEALTH STABILITY: PHYSICAL HEALTH: EXERCISE TYPE: DEVELOPMENTAL SEQUENCE,

## 2024-03-07 ASSESSMENT — ACTIVITIES OF DAILY LIVING (ADL)
AMBULATION ASSISTANCE: ONE PERSON
AMBULATION ASSISTANCE ON FLAT SURFACES: 1
AMBULATION ASSISTANCE: STAND BY ASSIST

## 2024-03-07 ASSESSMENT — PAIN SCALES - PAIN ASSESSMENT IN ADVANCED DEMENTIA (PAINAD)
FACIALEXPRESSION: 0
BODYLANGUAGE: 1 - TENSE. DISTRESSED PACING. FIDGETING.
BREATHING: 0
CONSOLABILITY: 0
NEGVOCALIZATION: 1 - OCCASIONAL MOAN OR GROAN. LOW-LEVEL SPEECH WITH A NEGATIVE OR DISAPPROVING QUALITY.
TOTALSCORE: 2
CONSOLABILITY: 0 - NO NEED TO CONSOLE.
BODYLANGUAGE: 1
FACIALEXPRESSION: 0 - SMILING OR INEXPRESSIVE.
NEGVOCALIZATION: 1

## 2024-03-07 ASSESSMENT — ENCOUNTER SYMPTOMS
ARTHRALGIAS: 1
PAIN: 1
PERSON REPORTING PAIN: PATIENT
PAIN LOCATION - PAIN FREQUENCY: INTERMITTENT
PAIN LOCATION - EXACERBATING FACTORS: WALKING
LOWEST PAIN SEVERITY IN PAST 24 HOURS: 4/10
PAIN LOCATION - PAIN QUALITY: ACHING
PAIN LOCATION - PAIN SEVERITY: 5/10
OCCASIONAL FEELINGS OF UNSTEADINESS: 1
HIGHEST PAIN SEVERITY IN PAST 24 HOURS: 6/10
SUBJECTIVE PAIN PROGRESSION: WAXING AND WANING
MUSCLE WEAKNESS: 1
PAIN SEVERITY GOAL: 0/10
PAIN LOCATION: LEFT KNEE

## 2024-03-08 ENCOUNTER — SPECIALTY PHARMACY (OUTPATIENT)
Dept: NEUROLOGY | Facility: HOSPITAL | Age: 64
End: 2024-03-08
Payer: MEDICARE

## 2024-03-08 PROCEDURE — 1090000001 HH PPS REVENUE CREDIT

## 2024-03-08 PROCEDURE — 1090000002 HH PPS REVENUE DEBIT

## 2024-03-08 NOTE — PROGRESS NOTES
[facility] Clinical Note    Chery Emery is a 63 y.o. female, who is on the specialty pharmacy service of: [facility].  Chery Emery is taking: ozanimod (Zeposia) 0.92 mg capsules.     Chery was contacted on 3/8/2024.    Refer to the encounter summary report for documentation details about patient counseling and education.      Impression/Plan  Is patient high risk? (potential patients:  pregnancy, geriatric, pediatric)     Is laboratory follow-up needed?   Is a clinical intervention needed?    Next assessment date?    Additional comments:    Medication Adherence  The importance of adherence was discussed with the patient and they were advised to take the medication as prescribed by their provider. Chery was encouraged to call her physician's office if they have a question regarding a missed dose.     Conclusion  Rate your quality of life on scale of 1-10: -- (Deferred until reassessment.)  Rate your satisfaction with  Specialty Pharmacy on scale of 1-10: -- (Deferred until reassessment.)      Patient advised to contact the pharmacy if there are any changes to her medication list, including prescriptions, OTC medications, herbal products, or supplements. Patient was advised of [facility]’s dispensing process, refill timeline, contact information ([phone]), and patient management follow up. Patient confirmed understanding of education conducted during assessment. All patient questions and concerns were addressed to the best of my ability. Patient was encouraged to contact the specialty pharmacy with any questions or concerns.    Confirmed follow-up outreaches are properly scheduled. Reviewed goals of therapy in the program targets.    Wilner Galloway, BettieD

## 2024-03-09 ENCOUNTER — HOME CARE VISIT (OUTPATIENT)
Dept: HOME HEALTH SERVICES | Facility: HOME HEALTH | Age: 64
End: 2024-03-09
Payer: MEDICARE

## 2024-03-09 PROCEDURE — 1090000001 HH PPS REVENUE CREDIT

## 2024-03-09 PROCEDURE — 1090000002 HH PPS REVENUE DEBIT

## 2024-03-10 PROCEDURE — 1090000001 HH PPS REVENUE CREDIT

## 2024-03-10 PROCEDURE — 1090000002 HH PPS REVENUE DEBIT

## 2024-03-11 PROCEDURE — 1090000001 HH PPS REVENUE CREDIT

## 2024-03-11 PROCEDURE — 1090000002 HH PPS REVENUE DEBIT

## 2024-03-12 PROCEDURE — 1090000002 HH PPS REVENUE DEBIT

## 2024-03-12 PROCEDURE — 1090000001 HH PPS REVENUE CREDIT

## 2024-03-13 PROCEDURE — 1090000002 HH PPS REVENUE DEBIT

## 2024-03-13 PROCEDURE — 1090000001 HH PPS REVENUE CREDIT

## 2024-03-14 ENCOUNTER — HOME CARE VISIT (OUTPATIENT)
Dept: HOME HEALTH SERVICES | Facility: HOME HEALTH | Age: 64
End: 2024-03-14
Payer: MEDICARE

## 2024-03-14 PROCEDURE — 1090000001 HH PPS REVENUE CREDIT

## 2024-03-14 PROCEDURE — 1090000002 HH PPS REVENUE DEBIT

## 2024-03-15 ENCOUNTER — HOME CARE VISIT (OUTPATIENT)
Dept: HOME HEALTH SERVICES | Facility: HOME HEALTH | Age: 64
End: 2024-03-15
Payer: MEDICARE

## 2024-03-15 PROCEDURE — 1090000002 HH PPS REVENUE DEBIT

## 2024-03-15 PROCEDURE — 1090000001 HH PPS REVENUE CREDIT

## 2024-03-16 PROCEDURE — 1090000002 HH PPS REVENUE DEBIT

## 2024-03-16 PROCEDURE — 1090000001 HH PPS REVENUE CREDIT

## 2024-03-17 PROCEDURE — 1090000001 HH PPS REVENUE CREDIT

## 2024-03-17 PROCEDURE — 1090000002 HH PPS REVENUE DEBIT

## 2024-03-18 ENCOUNTER — HOME CARE VISIT (OUTPATIENT)
Dept: HOME HEALTH SERVICES | Facility: HOME HEALTH | Age: 64
End: 2024-03-18
Payer: MEDICARE

## 2024-03-18 VITALS — RESPIRATION RATE: 16 BRPM | OXYGEN SATURATION: 98 % | HEART RATE: 78 BPM | TEMPERATURE: 98.6 F

## 2024-03-18 PROCEDURE — 1090000002 HH PPS REVENUE DEBIT

## 2024-03-18 PROCEDURE — 1090000001 HH PPS REVENUE CREDIT

## 2024-03-18 PROCEDURE — G0180 MD CERTIFICATION HHA PATIENT: HCPCS | Performed by: PSYCHIATRY & NEUROLOGY

## 2024-03-18 PROCEDURE — G0151 HHCP-SERV OF PT,EA 15 MIN: HCPCS | Mod: HHH

## 2024-03-18 SDOH — HEALTH STABILITY: PHYSICAL HEALTH: EXERCISE TYPE: DEVELOPMENTAL SEQUENCE

## 2024-03-18 ASSESSMENT — PAIN SCALES - PAIN ASSESSMENT IN ADVANCED DEMENTIA (PAINAD)
BREATHING: 1
CONSOLABILITY: 0 - NO NEED TO CONSOLE.
BODYLANGUAGE: 0
NEGVOCALIZATION: 1
FACIALEXPRESSION: 0 - SMILING OR INEXPRESSIVE.
NEGVOCALIZATION: 1 - OCCASIONAL MOAN OR GROAN. LOW-LEVEL SPEECH WITH A NEGATIVE OR DISAPPROVING QUALITY.
CONSOLABILITY: 0
FACIALEXPRESSION: 0
TOTALSCORE: 2
BODYLANGUAGE: 0 - RELAXED.

## 2024-03-18 ASSESSMENT — ENCOUNTER SYMPTOMS
PAIN LOCATION: LEFT KNEE
PERSON REPORTING PAIN: PATIENT
PAIN LOCATION - PAIN QUALITY: ACHING
PAIN SEVERITY GOAL: 1/10
PAIN LOCATION - PAIN SEVERITY: 4/10
SUBJECTIVE PAIN PROGRESSION: WAXING AND WANING
MUSCLE WEAKNESS: 1
OCCASIONAL FEELINGS OF UNSTEADINESS: 1
HIGHEST PAIN SEVERITY IN PAST 24 HOURS: 5/10
PAIN LOCATION - PAIN FREQUENCY: INTERMITTENT
PAIN LOCATION - EXACERBATING FACTORS: WALKING
ARTHRALGIAS: 1
PAIN: 1
LOWEST PAIN SEVERITY IN PAST 24 HOURS: 2/10

## 2024-03-18 ASSESSMENT — ACTIVITIES OF DAILY LIVING (ADL)
AMBULATION_DISTANCE/DURATION_TOLERATED: ABNORMAL PATTERN
AMBULATION ASSISTANCE: STAND BY ASSIST
AMBULATION ASSISTANCE ON FLAT SURFACES: 1

## 2024-03-19 ENCOUNTER — TELEPHONE (OUTPATIENT)
Dept: NEUROLOGY | Facility: HOSPITAL | Age: 64
End: 2024-03-19
Payer: MEDICARE

## 2024-03-19 PROCEDURE — 1090000002 HH PPS REVENUE DEBIT

## 2024-03-19 PROCEDURE — 1090000001 HH PPS REVENUE CREDIT

## 2024-03-19 NOTE — TELEPHONE ENCOUNTER
"Chery Emery called. She has had increased swelling in her legs & feet since 2/22/24 ( the day she started Zepozia). 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 She feels this is related to the zepozia. Please call her back at [phone] (home) [phone] (work)    "

## 2024-03-20 PROCEDURE — 1090000002 HH PPS REVENUE DEBIT

## 2024-03-20 PROCEDURE — 1090000001 HH PPS REVENUE CREDIT

## 2024-03-21 PROCEDURE — 1090000001 HH PPS REVENUE CREDIT

## 2024-03-21 PROCEDURE — 1090000002 HH PPS REVENUE DEBIT

## 2024-03-22 ENCOUNTER — HOME CARE VISIT (OUTPATIENT)
Dept: HOME HEALTH SERVICES | Facility: HOME HEALTH | Age: 64
End: 2024-03-22
Payer: MEDICARE

## 2024-03-22 VITALS — TEMPERATURE: 98.6 F | RESPIRATION RATE: 14 BRPM | HEART RATE: 72 BPM | OXYGEN SATURATION: 98 %

## 2024-03-22 PROCEDURE — 1090000002 HH PPS REVENUE DEBIT

## 2024-03-22 PROCEDURE — 1090000001 HH PPS REVENUE CREDIT

## 2024-03-22 PROCEDURE — 0023 HH SOC

## 2024-03-22 PROCEDURE — G0151 HHCP-SERV OF PT,EA 15 MIN: HCPCS | Mod: HHH

## 2024-03-22 SDOH — HEALTH STABILITY: PHYSICAL HEALTH: EXERCISE TYPE: DEVELOPMENTAL SEQUENCE,PRONE PROGRAM

## 2024-03-22 ASSESSMENT — PAIN SCALES - PAIN ASSESSMENT IN ADVANCED DEMENTIA (PAINAD)
BODYLANGUAGE: 0 - RELAXED.
NEGVOCALIZATION: 1 - OCCASIONAL MOAN OR GROAN. LOW-LEVEL SPEECH WITH A NEGATIVE OR DISAPPROVING QUALITY.
TOTALSCORE: 1
NEGVOCALIZATION: 1
CONSOLABILITY: 0
CONSOLABILITY: 0 - NO NEED TO CONSOLE.
BODYLANGUAGE: 0
FACIALEXPRESSION: 0 - SMILING OR INEXPRESSIVE.
BREATHING: 0
FACIALEXPRESSION: 0

## 2024-03-22 ASSESSMENT — ENCOUNTER SYMPTOMS
PAIN: 1
SUBJECTIVE PAIN PROGRESSION: GRADUALLY IMPROVING
MUSCLE WEAKNESS: 1
HIGHEST PAIN SEVERITY IN PAST 24 HOURS: 3/10
LOWEST PAIN SEVERITY IN PAST 24 HOURS: 2/10
PERSON REPORTING PAIN: PATIENT
ARTHRALGIAS: 1
PAIN LOCATION: LEFT KNEE
OCCASIONAL FEELINGS OF UNSTEADINESS: 1
PAIN SEVERITY GOAL: 0/10

## 2024-03-22 ASSESSMENT — ACTIVITIES OF DAILY LIVING (ADL)
AMBULATION ASSISTANCE: ONE PERSON
CURRENT_FUNCTION: STAND BY ASSIST
AMBULATION ASSISTANCE: STAND BY ASSIST
AMBULATION ASSISTANCE ON FLAT SURFACES: 1
CURRENT_FUNCTION: ONE PERSON

## 2024-03-22 NOTE — TELEPHONE ENCOUNTER
"Ike Ndiaye PT called. He would like to request that you send a script for a \"sweedish knee cage\" for Chery's Left knee. She has troubles with her knee hyperflexing, he feels this would help resolve this and allow her to strengthening her muscles at the same time. I will fax the order to Sandrita at [facility] at [phone] or call at [phone]. They will also need full documentation on the clinic note to state the need, length of need, diagnosis ect.  "

## 2024-03-23 PROCEDURE — 1090000001 HH PPS REVENUE CREDIT

## 2024-03-23 PROCEDURE — 1090000002 HH PPS REVENUE DEBIT

## 2024-03-24 PROCEDURE — 1090000001 HH PPS REVENUE CREDIT

## 2024-03-24 PROCEDURE — 1090000002 HH PPS REVENUE DEBIT

## 2024-03-25 PROCEDURE — 1090000001 HH PPS REVENUE CREDIT

## 2024-03-25 PROCEDURE — 1090000002 HH PPS REVENUE DEBIT

## 2024-03-26 PROCEDURE — 1090000002 HH PPS REVENUE DEBIT

## 2024-03-26 PROCEDURE — 1090000001 HH PPS REVENUE CREDIT

## 2024-03-27 PROCEDURE — 1090000002 HH PPS REVENUE DEBIT

## 2024-03-27 PROCEDURE — 1090000001 HH PPS REVENUE CREDIT

## 2024-03-28 PROCEDURE — 1090000001 HH PPS REVENUE CREDIT

## 2024-03-28 PROCEDURE — 1090000002 HH PPS REVENUE DEBIT

## 2024-03-29 ENCOUNTER — SPECIALTY PHARMACY (OUTPATIENT)
Dept: PHARMACY | Facility: CLINIC | Age: 64
End: 2024-03-29

## 2024-03-29 ENCOUNTER — HOME CARE VISIT (OUTPATIENT)
Dept: HOME HEALTH SERVICES | Facility: HOME HEALTH | Age: 64
End: 2024-03-29
Payer: MEDICARE

## 2024-03-29 VITALS — TEMPERATURE: 98.6 F | HEART RATE: 68 BPM | RESPIRATION RATE: 14 BRPM | OXYGEN SATURATION: 98 %

## 2024-03-29 PROCEDURE — RXMED WILLOW AMBULATORY MEDICATION CHARGE

## 2024-03-29 PROCEDURE — 1090000001 HH PPS REVENUE CREDIT

## 2024-03-29 PROCEDURE — 1090000002 HH PPS REVENUE DEBIT

## 2024-03-29 PROCEDURE — G0151 HHCP-SERV OF PT,EA 15 MIN: HCPCS | Mod: HHH

## 2024-03-29 SDOH — HEALTH STABILITY: PHYSICAL HEALTH: EXERCISE TYPE: UPDATE HEP,DEVELOPMENTAL SEQUENCE

## 2024-03-29 ASSESSMENT — ENCOUNTER SYMPTOMS
PERSON REPORTING PAIN: PATIENT
MUSCLE WEAKNESS: 1
ARTHRALGIAS: 1
SUBJECTIVE PAIN PROGRESSION: GRADUALLY IMPROVING
PAIN SEVERITY GOAL: 0/10
OCCASIONAL FEELINGS OF UNSTEADINESS: 1
PAIN LOCATION: LEFT KNEE
PAIN: 1
HIGHEST PAIN SEVERITY IN PAST 24 HOURS: 3/10
LOWEST PAIN SEVERITY IN PAST 24 HOURS: 1/10
PAIN LOCATION - PAIN SEVERITY: 2/10

## 2024-03-29 ASSESSMENT — PAIN SCALES - PAIN ASSESSMENT IN ADVANCED DEMENTIA (PAINAD)
CONSOLABILITY: 0
BODYLANGUAGE: 0 - RELAXED.
CONSOLABILITY: 0 - NO NEED TO CONSOLE.
TOTALSCORE: 2
NEGVOCALIZATION: 0 - NONE.
NEGVOCALIZATION: 0
FACIALEXPRESSION: 2 - FACIAL GRIMACING.
BREATHING: 0
FACIALEXPRESSION: 2
BODYLANGUAGE: 0

## 2024-03-29 ASSESSMENT — ACTIVITIES OF DAILY LIVING (ADL): AMBULATION ASSISTANCE ON FLAT SURFACES: 1

## 2024-03-30 PROCEDURE — 1090000002 HH PPS REVENUE DEBIT

## 2024-03-30 PROCEDURE — 1090000001 HH PPS REVENUE CREDIT

## 2024-03-31 PROCEDURE — 1090000001 HH PPS REVENUE CREDIT

## 2024-03-31 PROCEDURE — 1090000002 HH PPS REVENUE DEBIT

## 2024-04-01 PROCEDURE — 1090000001 HH PPS REVENUE CREDIT

## 2024-04-01 PROCEDURE — 1090000002 HH PPS REVENUE DEBIT

## 2024-04-02 ENCOUNTER — PHARMACY VISIT (OUTPATIENT)
Dept: PHARMACY | Facility: CLINIC | Age: 64
End: 2024-04-02
Payer: COMMERCIAL

## 2024-04-02 ENCOUNTER — HOSPITAL ENCOUNTER (OUTPATIENT)
Dept: RADIOLOGY | Facility: CLINIC | Age: 64
Discharge: HOME | End: 2024-04-02
Payer: MEDICARE

## 2024-04-02 DIAGNOSIS — G35 MULTIPLE SCLEROSIS (MULTI): ICD-10-CM

## 2024-04-02 PROCEDURE — 1090000002 HH PPS REVENUE DEBIT

## 2024-04-02 PROCEDURE — 1090000001 HH PPS REVENUE CREDIT

## 2024-04-02 PROCEDURE — 2550000001 HC RX 255 CONTRASTS: Performed by: PSYCHIATRY & NEUROLOGY

## 2024-04-02 PROCEDURE — 70553 MRI BRAIN STEM W/O & W/DYE: CPT

## 2024-04-02 PROCEDURE — 70553 MRI BRAIN STEM W/O & W/DYE: CPT | Performed by: STUDENT IN AN ORGANIZED HEALTH CARE EDUCATION/TRAINING PROGRAM

## 2024-04-02 PROCEDURE — A9575 INJ GADOTERATE MEGLUMI 0.1ML: HCPCS | Performed by: PSYCHIATRY & NEUROLOGY

## 2024-04-02 RX ORDER — GADOTERATE MEGLUMINE 376.9 MG/ML
10 INJECTION INTRAVENOUS
Status: COMPLETED | OUTPATIENT
Start: 2024-04-02 | End: 2024-04-02

## 2024-04-02 RX ADMIN — GADOTERATE MEGLUMINE 10 ML: 376.9 INJECTION INTRAVENOUS at 13:48

## 2024-04-02 ASSESSMENT — ENCOUNTER SYMPTOMS
LOSS OF SENSATION IN FEET: 0
DEPRESSION: 0
OCCASIONAL FEELINGS OF UNSTEADINESS: 0

## 2024-04-03 PROCEDURE — 1090000002 HH PPS REVENUE DEBIT

## 2024-04-03 PROCEDURE — 1090000001 HH PPS REVENUE CREDIT

## 2024-04-04 PROCEDURE — 1090000001 HH PPS REVENUE CREDIT

## 2024-04-04 PROCEDURE — 1090000002 HH PPS REVENUE DEBIT

## 2024-04-05 PROCEDURE — 1090000001 HH PPS REVENUE CREDIT

## 2024-04-05 PROCEDURE — 1090000002 HH PPS REVENUE DEBIT

## 2024-04-06 PROCEDURE — 1090000001 HH PPS REVENUE CREDIT

## 2024-04-06 PROCEDURE — 1090000002 HH PPS REVENUE DEBIT

## 2024-04-07 PROCEDURE — 1090000002 HH PPS REVENUE DEBIT

## 2024-04-07 PROCEDURE — 1090000001 HH PPS REVENUE CREDIT

## 2024-04-08 PROCEDURE — 1090000001 HH PPS REVENUE CREDIT

## 2024-04-08 PROCEDURE — 1090000002 HH PPS REVENUE DEBIT

## 2024-04-09 PROCEDURE — 1090000002 HH PPS REVENUE DEBIT

## 2024-04-09 PROCEDURE — 1090000001 HH PPS REVENUE CREDIT

## 2024-04-10 PROCEDURE — 1090000002 HH PPS REVENUE DEBIT

## 2024-04-10 PROCEDURE — 1090000001 HH PPS REVENUE CREDIT

## 2024-04-11 PROCEDURE — 1090000002 HH PPS REVENUE DEBIT

## 2024-04-11 PROCEDURE — 1090000001 HH PPS REVENUE CREDIT

## 2024-04-12 PROCEDURE — 1090000002 HH PPS REVENUE DEBIT

## 2024-04-12 PROCEDURE — 1090000001 HH PPS REVENUE CREDIT

## 2024-04-13 ENCOUNTER — APPOINTMENT (OUTPATIENT)
Dept: HOME HEALTH SERVICES | Facility: HOME HEALTH | Age: 64
End: 2024-04-13
Payer: MEDICARE

## 2024-04-13 PROCEDURE — 1090000002 HH PPS REVENUE DEBIT

## 2024-04-13 PROCEDURE — 1090000001 HH PPS REVENUE CREDIT

## 2024-04-14 PROCEDURE — 1090000001 HH PPS REVENUE CREDIT

## 2024-04-14 PROCEDURE — 1090000002 HH PPS REVENUE DEBIT

## 2024-04-15 ENCOUNTER — HOME CARE VISIT (OUTPATIENT)
Dept: HOME HEALTH SERVICES | Facility: HOME HEALTH | Age: 64
End: 2024-04-15
Payer: MEDICARE

## 2024-04-15 ENCOUNTER — TELEPHONE (OUTPATIENT)
Dept: NEUROLOGY | Facility: CLINIC | Age: 64
End: 2024-04-15
Payer: MEDICARE

## 2024-04-15 DIAGNOSIS — G35 MULTIPLE SCLEROSIS (MULTI): Primary | ICD-10-CM

## 2024-04-15 PROCEDURE — 1090000001 HH PPS REVENUE CREDIT

## 2024-04-15 PROCEDURE — G0151 HHCP-SERV OF PT,EA 15 MIN: HCPCS | Mod: HHH

## 2024-04-15 PROCEDURE — 1090000002 HH PPS REVENUE DEBIT

## 2024-04-15 NOTE — TELEPHONE ENCOUNTER
[facility] called about a script for a brace for patient... Stated the called a couple weeks ago and message was sent over by Helga... Brace is Swedish Knee Cage ... Please fax to [phone] Att Flores

## 2024-04-16 VITALS — TEMPERATURE: 98.6 F | HEART RATE: 72 BPM | RESPIRATION RATE: 14 BRPM | OXYGEN SATURATION: 98 %

## 2024-04-16 PROCEDURE — 1090000001 HH PPS REVENUE CREDIT

## 2024-04-16 PROCEDURE — 1090000002 HH PPS REVENUE DEBIT

## 2024-04-16 SDOH — HEALTH STABILITY: PHYSICAL HEALTH: EXERCISE TYPE: HEP DEVELOPMENT,FLOOR PROGRAM,DEVELOPMENTAL SEQUENC

## 2024-04-16 ASSESSMENT — ACTIVITIES OF DAILY LIVING (ADL)
AMBULATION ASSISTANCE: STAND BY ASSIST
CURRENT_FUNCTION: STAND BY ASSIST
OASIS_M1830: 01
ENTERING_EXITING_HOME: NEEDS ASSISTANCE
AMBULATION ASSISTANCE ON FLAT SURFACES: 1

## 2024-04-16 ASSESSMENT — PAIN SCALES - PAIN ASSESSMENT IN ADVANCED DEMENTIA (PAINAD)
CONSOLABILITY: 0 - NO NEED TO CONSOLE.
BODYLANGUAGE: 0
NEGVOCALIZATION: 1
TOTALSCORE: 1
FACIALEXPRESSION: 0 - SMILING OR INEXPRESSIVE.
BODYLANGUAGE: 0 - RELAXED.
CONSOLABILITY: 0
BREATHING: 0
NEGVOCALIZATION: 1 - OCCASIONAL MOAN OR GROAN. LOW-LEVEL SPEECH WITH A NEGATIVE OR DISAPPROVING QUALITY.
FACIALEXPRESSION: 0

## 2024-04-16 ASSESSMENT — ENCOUNTER SYMPTOMS
PAIN LOCATION - EXACERBATING FACTORS: WALKING
SUBJECTIVE PAIN PROGRESSION: WAXING AND WANING
PAIN SEVERITY GOAL: 0/10
PERSON REPORTING PAIN: PATIENT
OCCASIONAL FEELINGS OF UNSTEADINESS: 1
ARTHRALGIAS: 1
HIGHEST PAIN SEVERITY IN PAST 24 HOURS: 4/10
LOWEST PAIN SEVERITY IN PAST 24 HOURS: 3/10
LIMITED RANGE OF MOTION: 1
PAIN LOCATION - PAIN SEVERITY: 5/10
PAIN: 1
PAIN LOCATION: LEFT KNEE
MUSCLE WEAKNESS: 1

## 2024-04-16 NOTE — CASE COMMUNICATION
Plan to recertify patient  for few weeks to assist patient in obtaining appropriate brace for Left knee

## 2024-04-17 PROCEDURE — 1090000001 HH PPS REVENUE CREDIT

## 2024-04-17 PROCEDURE — 1090000002 HH PPS REVENUE DEBIT

## 2024-04-17 ASSESSMENT — ENCOUNTER SYMPTOMS
DEPRESSION: 0
LOSS OF SENSATION IN FEET: 1

## 2024-04-18 PROCEDURE — 1090000001 HH PPS REVENUE CREDIT

## 2024-04-18 PROCEDURE — 1090000002 HH PPS REVENUE DEBIT

## 2024-04-19 PROCEDURE — 1090000001 HH PPS REVENUE CREDIT

## 2024-04-19 PROCEDURE — 1090000002 HH PPS REVENUE DEBIT

## 2024-04-20 PROCEDURE — 1090000001 HH PPS REVENUE CREDIT

## 2024-04-20 PROCEDURE — 1090000002 HH PPS REVENUE DEBIT

## 2024-04-21 PROCEDURE — 1090000001 HH PPS REVENUE CREDIT

## 2024-04-21 PROCEDURE — 1090000002 HH PPS REVENUE DEBIT

## 2024-04-22 PROCEDURE — 1090000002 HH PPS REVENUE DEBIT

## 2024-04-22 PROCEDURE — 1090000001 HH PPS REVENUE CREDIT

## 2024-04-23 ENCOUNTER — HOME CARE VISIT (OUTPATIENT)
Dept: HOME HEALTH SERVICES | Facility: HOME HEALTH | Age: 64
End: 2024-04-23
Payer: MEDICARE

## 2024-04-23 PROCEDURE — 1090000001 HH PPS REVENUE CREDIT

## 2024-04-23 PROCEDURE — 1090000002 HH PPS REVENUE DEBIT

## 2024-04-23 PROCEDURE — G0151 HHCP-SERV OF PT,EA 15 MIN: HCPCS | Mod: HHH

## 2024-04-23 PROCEDURE — 400014 HH F/U

## 2024-04-24 VITALS — OXYGEN SATURATION: 98 % | RESPIRATION RATE: 14 BRPM | TEMPERATURE: 98.6 F

## 2024-04-24 PROCEDURE — 1090000002 HH PPS REVENUE DEBIT

## 2024-04-24 PROCEDURE — 1090000001 HH PPS REVENUE CREDIT

## 2024-04-24 SDOH — HEALTH STABILITY: PHYSICAL HEALTH: EXERCISE TYPE: UPDATED  HEP,DEVELOPMENTAL SEQUENCE

## 2024-04-24 ASSESSMENT — ENCOUNTER SYMPTOMS
PAIN SEVERITY GOAL: 1/10
PAIN LOCATION - PAIN FREQUENCY: FREQUENT
ARTHRALGIAS: 1
MUSCLE WEAKNESS: 1
LOSS OF SENSATION IN FEET: 1
LOWEST PAIN SEVERITY IN PAST 24 HOURS: 4/10
PAIN LOCATION - EXACERBATING FACTORS: WALKING
HIGHEST PAIN SEVERITY IN PAST 24 HOURS: 6/10
SUBJECTIVE PAIN PROGRESSION: WAXING AND WANING
PERSON REPORTING PAIN: PATIENT
DEPRESSION: 0
PAIN: 1
PAIN LOCATION - PAIN SEVERITY: 5/10
PAIN LOCATION: LEFT KNEE
OCCASIONAL FEELINGS OF UNSTEADINESS: 1

## 2024-04-24 ASSESSMENT — ACTIVITIES OF DAILY LIVING (ADL)
AMBULATION_DISTANCE/DURATION_TOLERATED: ABNORMAL PATTERN
CURRENT_FUNCTION: STAND BY ASSIST

## 2024-04-24 ASSESSMENT — PAIN SCALES - PAIN ASSESSMENT IN ADVANCED DEMENTIA (PAINAD)
CONSOLABILITY: 0 - NO NEED TO CONSOLE.
TOTALSCORE: 4
NEGVOCALIZATION: 1 - OCCASIONAL MOAN OR GROAN. LOW-LEVEL SPEECH WITH A NEGATIVE OR DISAPPROVING QUALITY.
CONSOLABILITY: 0
NEGVOCALIZATION: 1
BREATHING: 0
FACIALEXPRESSION: 2 - FACIAL GRIMACING.
BODYLANGUAGE: 1 - TENSE. DISTRESSED PACING. FIDGETING.
BODYLANGUAGE: 1
FACIALEXPRESSION: 2

## 2024-04-24 NOTE — CASE COMMUNICATION
Orders for Lao Knee cage must be physically faxed to [facility] at [phone].  epic entry will not suffice

## 2024-04-25 PROCEDURE — 1090000002 HH PPS REVENUE DEBIT

## 2024-04-25 PROCEDURE — 1090000001 HH PPS REVENUE CREDIT

## 2024-04-26 ENCOUNTER — SPECIALTY PHARMACY (OUTPATIENT)
Dept: PHARMACY | Facility: CLINIC | Age: 64
End: 2024-04-26

## 2024-04-26 ENCOUNTER — PHARMACY VISIT (OUTPATIENT)
Dept: PHARMACY | Facility: CLINIC | Age: 64
End: 2024-04-26
Payer: COMMERCIAL

## 2024-04-26 PROCEDURE — 1090000001 HH PPS REVENUE CREDIT

## 2024-04-26 PROCEDURE — RXMED WILLOW AMBULATORY MEDICATION CHARGE

## 2024-04-26 PROCEDURE — 1090000002 HH PPS REVENUE DEBIT

## 2024-04-27 PROCEDURE — 1090000002 HH PPS REVENUE DEBIT

## 2024-04-27 PROCEDURE — 1090000001 HH PPS REVENUE CREDIT

## 2024-04-28 PROCEDURE — 1090000002 HH PPS REVENUE DEBIT

## 2024-04-28 PROCEDURE — 1090000001 HH PPS REVENUE CREDIT

## 2024-04-29 PROCEDURE — 1090000002 HH PPS REVENUE DEBIT

## 2024-04-29 PROCEDURE — 1090000001 HH PPS REVENUE CREDIT

## 2024-04-30 PROCEDURE — 1090000001 HH PPS REVENUE CREDIT

## 2024-04-30 PROCEDURE — 1090000002 HH PPS REVENUE DEBIT

## 2024-05-01 PROCEDURE — 1090000002 HH PPS REVENUE DEBIT

## 2024-05-01 PROCEDURE — 1090000001 HH PPS REVENUE CREDIT

## 2024-05-02 ENCOUNTER — HOME CARE VISIT (OUTPATIENT)
Dept: HOME HEALTH SERVICES | Facility: HOME HEALTH | Age: 64
End: 2024-05-02
Payer: MEDICARE

## 2024-05-02 VITALS — TEMPERATURE: 98.6 F | OXYGEN SATURATION: 98 % | RESPIRATION RATE: 14 BRPM | HEART RATE: 72 BPM

## 2024-05-02 PROCEDURE — 1090000001 HH PPS REVENUE CREDIT

## 2024-05-02 PROCEDURE — 1090000002 HH PPS REVENUE DEBIT

## 2024-05-02 PROCEDURE — G0151 HHCP-SERV OF PT,EA 15 MIN: HCPCS | Mod: HHH

## 2024-05-02 ASSESSMENT — ENCOUNTER SYMPTOMS
PAIN LOCATION - PAIN FREQUENCY: FREQUENT
HIGHEST PAIN SEVERITY IN PAST 24 HOURS: 6/10
PAIN SEVERITY GOAL: 1/10
PAIN LOCATION - PAIN SEVERITY: 5/10
PAIN LOCATION: LEFT KNEE
PAIN: 1
SUBJECTIVE PAIN PROGRESSION: GRADUALLY WORSENING
PAIN LOCATION - PAIN QUALITY: ACHING
PAIN LOCATION - EXACERBATING FACTORS: WALKING,
LOWEST PAIN SEVERITY IN PAST 24 HOURS: 2/10
PAIN LOCATION - RELIEVING FACTORS: BRACING
PERSON REPORTING PAIN: PATIENT

## 2024-05-02 ASSESSMENT — PAIN SCALES - PAIN ASSESSMENT IN ADVANCED DEMENTIA (PAINAD)
BODYLANGUAGE: 0 - RELAXED.
NEGVOCALIZATION: 1 - OCCASIONAL MOAN OR GROAN. LOW-LEVEL SPEECH WITH A NEGATIVE OR DISAPPROVING QUALITY.
NEGVOCALIZATION: 1
FACIALEXPRESSION: 2
FACIALEXPRESSION: 2 - FACIAL GRIMACING.
TOTALSCORE: 5
CONSOLABILITY: 1
CONSOLABILITY: 1 - DISTRACTED OR REASSURED BY VOICE OR TOUCH.
BODYLANGUAGE: 0
BREATHING: 1

## 2024-05-03 PROCEDURE — 1090000002 HH PPS REVENUE DEBIT

## 2024-05-03 PROCEDURE — 1090000001 HH PPS REVENUE CREDIT

## 2024-05-03 SDOH — HEALTH STABILITY: PHYSICAL HEALTH: EXERCISE TYPE: DEVELOPMENTAL SEQUENCE

## 2024-05-03 ASSESSMENT — ACTIVITIES OF DAILY LIVING (ADL)
AMBULATION_DISTANCE/DURATION_TOLERATED: ABNORMAL PATTERN
AMBULATION ASSISTANCE ON FLAT SURFACES: 1

## 2024-05-03 ASSESSMENT — ENCOUNTER SYMPTOMS
OCCASIONAL FEELINGS OF UNSTEADINESS: 1
MUSCLE WEAKNESS: 1
ARTHRALGIAS: 1

## 2024-05-04 PROCEDURE — 1090000001 HH PPS REVENUE CREDIT

## 2024-05-04 PROCEDURE — 1090000002 HH PPS REVENUE DEBIT

## 2024-05-05 PROCEDURE — 1090000001 HH PPS REVENUE CREDIT

## 2024-05-05 PROCEDURE — 1090000002 HH PPS REVENUE DEBIT

## 2024-05-06 PROCEDURE — 1090000002 HH PPS REVENUE DEBIT

## 2024-05-06 PROCEDURE — 1090000001 HH PPS REVENUE CREDIT

## 2024-05-07 PROCEDURE — 1090000001 HH PPS REVENUE CREDIT

## 2024-05-07 PROCEDURE — 1090000002 HH PPS REVENUE DEBIT

## 2024-05-08 PROCEDURE — 1090000001 HH PPS REVENUE CREDIT

## 2024-05-08 PROCEDURE — 1090000002 HH PPS REVENUE DEBIT

## 2024-05-09 ENCOUNTER — HOME CARE VISIT (OUTPATIENT)
Dept: HOME HEALTH SERVICES | Facility: HOME HEALTH | Age: 64
End: 2024-05-09
Payer: MEDICARE

## 2024-05-09 PROCEDURE — 1090000002 HH PPS REVENUE DEBIT

## 2024-05-09 PROCEDURE — G0151 HHCP-SERV OF PT,EA 15 MIN: HCPCS | Mod: HHH

## 2024-05-09 PROCEDURE — 1090000001 HH PPS REVENUE CREDIT

## 2024-05-10 VITALS — TEMPERATURE: 98.6 F | OXYGEN SATURATION: 99 % | RESPIRATION RATE: 14 BRPM | HEART RATE: 88 BPM

## 2024-05-10 PROCEDURE — 1090000002 HH PPS REVENUE DEBIT

## 2024-05-10 PROCEDURE — 1090000001 HH PPS REVENUE CREDIT

## 2024-05-10 SDOH — HEALTH STABILITY: PHYSICAL HEALTH: EXERCISE TYPE: DEVELOPMENTAL SEQUENCE,STRENGTHENING

## 2024-05-10 ASSESSMENT — PAIN SCALES - PAIN ASSESSMENT IN ADVANCED DEMENTIA (PAINAD)
BODYLANGUAGE: 0
BREATHING: 0
BODYLANGUAGE: 0 - RELAXED.
CONSOLABILITY: 0 - NO NEED TO CONSOLE.
CONSOLABILITY: 0
NEGVOCALIZATION: 1
FACIALEXPRESSION: 0 - SMILING OR INEXPRESSIVE.
FACIALEXPRESSION: 0
TOTALSCORE: 1
NEGVOCALIZATION: 1 - OCCASIONAL MOAN OR GROAN. LOW-LEVEL SPEECH WITH A NEGATIVE OR DISAPPROVING QUALITY.

## 2024-05-10 ASSESSMENT — ENCOUNTER SYMPTOMS
DEPRESSION: 1
ARTHRALGIAS: 1
HIGHEST PAIN SEVERITY IN PAST 24 HOURS: 7/10
PAIN LOCATION - PAIN QUALITY: ACHING
SUBJECTIVE PAIN PROGRESSION: UNCHANGED
PAIN LOCATION: LEFT KNEE
MUSCLE WEAKNESS: 1
PERSON REPORTING PAIN: PATIENT
PAIN LOCATION - PAIN FREQUENCY: FREQUENT
PAIN LOCATION - PAIN SEVERITY: 5/10
PAIN: 1
LOSS OF SENSATION IN FEET: 1
LOWEST PAIN SEVERITY IN PAST 24 HOURS: 3/10
PAIN LOCATION - EXACERBATING FACTORS: WALKING
OCCASIONAL FEELINGS OF UNSTEADINESS: 1

## 2024-05-10 ASSESSMENT — ACTIVITIES OF DAILY LIVING (ADL)
AMBULATION ASSISTANCE: STAND BY ASSIST
CURRENT_FUNCTION: ONE PERSON
CURRENT_FUNCTION: STAND BY ASSIST
AMBULATION ASSISTANCE ON FLAT SURFACES: 1

## 2024-05-11 PROCEDURE — 1090000002 HH PPS REVENUE DEBIT

## 2024-05-11 PROCEDURE — 1090000001 HH PPS REVENUE CREDIT

## 2024-05-12 PROCEDURE — 1090000001 HH PPS REVENUE CREDIT

## 2024-05-12 PROCEDURE — 1090000002 HH PPS REVENUE DEBIT

## 2024-05-13 PROCEDURE — 1090000001 HH PPS REVENUE CREDIT

## 2024-05-13 PROCEDURE — 1090000002 HH PPS REVENUE DEBIT

## 2024-05-14 PROCEDURE — 1090000001 HH PPS REVENUE CREDIT

## 2024-05-14 PROCEDURE — 1090000002 HH PPS REVENUE DEBIT

## 2024-05-15 PROCEDURE — 1090000002 HH PPS REVENUE DEBIT

## 2024-05-15 PROCEDURE — 1090000001 HH PPS REVENUE CREDIT

## 2024-05-16 PROCEDURE — G0179 MD RECERTIFICATION HHA PT: HCPCS | Performed by: PSYCHIATRY & NEUROLOGY

## 2024-05-16 PROCEDURE — 1090000002 HH PPS REVENUE DEBIT

## 2024-05-16 PROCEDURE — 1090000001 HH PPS REVENUE CREDIT

## 2024-05-17 PROCEDURE — 1090000001 HH PPS REVENUE CREDIT

## 2024-05-17 PROCEDURE — 1090000002 HH PPS REVENUE DEBIT

## 2024-05-18 PROCEDURE — 1090000001 HH PPS REVENUE CREDIT

## 2024-05-18 PROCEDURE — 1090000002 HH PPS REVENUE DEBIT

## 2024-05-19 PROCEDURE — 1090000002 HH PPS REVENUE DEBIT

## 2024-05-19 PROCEDURE — 1090000001 HH PPS REVENUE CREDIT

## 2024-05-20 ENCOUNTER — HOME CARE VISIT (OUTPATIENT)
Dept: HOME HEALTH SERVICES | Facility: HOME HEALTH | Age: 64
End: 2024-05-20
Payer: MEDICARE

## 2024-05-20 PROCEDURE — 400014 HH F/U

## 2024-05-20 PROCEDURE — G0151 HHCP-SERV OF PT,EA 15 MIN: HCPCS | Mod: HHH

## 2024-05-20 PROCEDURE — 1090000002 HH PPS REVENUE DEBIT

## 2024-05-20 PROCEDURE — 1090000001 HH PPS REVENUE CREDIT

## 2024-05-21 PROCEDURE — 1090000002 HH PPS REVENUE DEBIT

## 2024-05-21 PROCEDURE — 1090000001 HH PPS REVENUE CREDIT

## 2024-05-22 ENCOUNTER — SPECIALTY PHARMACY (OUTPATIENT)
Dept: PHARMACY | Facility: CLINIC | Age: 64
End: 2024-05-22

## 2024-05-22 ENCOUNTER — PHARMACY VISIT (OUTPATIENT)
Dept: PHARMACY | Facility: CLINIC | Age: 64
End: 2024-05-22
Payer: COMMERCIAL

## 2024-05-22 PROCEDURE — 1090000002 HH PPS REVENUE DEBIT

## 2024-05-22 PROCEDURE — RXMED WILLOW AMBULATORY MEDICATION CHARGE

## 2024-05-22 PROCEDURE — 1090000001 HH PPS REVENUE CREDIT

## 2024-05-23 PROCEDURE — 1090000002 HH PPS REVENUE DEBIT

## 2024-05-23 PROCEDURE — 1090000001 HH PPS REVENUE CREDIT

## 2024-05-24 PROCEDURE — 1090000002 HH PPS REVENUE DEBIT

## 2024-05-24 PROCEDURE — 1090000001 HH PPS REVENUE CREDIT

## 2024-05-25 VITALS — RESPIRATION RATE: 14 BRPM | OXYGEN SATURATION: 98 % | HEART RATE: 72 BPM | TEMPERATURE: 98.6 F

## 2024-05-25 PROCEDURE — 1090000001 HH PPS REVENUE CREDIT

## 2024-05-25 PROCEDURE — 1090000002 HH PPS REVENUE DEBIT

## 2024-05-25 SDOH — HEALTH STABILITY: PHYSICAL HEALTH: EXERCISE TYPE: DEVELOPMENTAL SEQUENCE

## 2024-05-25 ASSESSMENT — ENCOUNTER SYMPTOMS
PAIN: 1
PAIN SEVERITY GOAL: 0/10
PAIN LOCATION: LEFT KNEE
ARTHRALGIAS: 1
LOWEST PAIN SEVERITY IN PAST 24 HOURS: 3/10
HIGHEST PAIN SEVERITY IN PAST 24 HOURS: 5/10
PERSON REPORTING PAIN: PATIENT
SUBJECTIVE PAIN PROGRESSION: UNCHANGED
PAIN LOCATION - PAIN SEVERITY: 4/10
OCCASIONAL FEELINGS OF UNSTEADINESS: 0
PAIN LOCATION - EXACERBATING FACTORS: WALKING
MUSCLE WEAKNESS: 1
PAIN LOCATION - PAIN FREQUENCY: FREQUENT
PAIN LOCATION - PAIN QUALITY: ACHING

## 2024-05-25 ASSESSMENT — ACTIVITIES OF DAILY LIVING (ADL)
AMBULATION ASSISTANCE: STAND BY ASSIST
AMBULATION ASSISTANCE ON FLAT SURFACES: 1
CURRENT_FUNCTION: STAND BY ASSIST

## 2024-05-25 ASSESSMENT — PAIN SCALES - PAIN ASSESSMENT IN ADVANCED DEMENTIA (PAINAD)
CONSOLABILITY: 0
FACIALEXPRESSION: 0
CONSOLABILITY: 0 - NO NEED TO CONSOLE.
BODYLANGUAGE: 0 - RELAXED.
FACIALEXPRESSION: 0 - SMILING OR INEXPRESSIVE.
NEGVOCALIZATION: 0 - NONE.
BREATHING: 0
TOTALSCORE: 0
BODYLANGUAGE: 0
NEGVOCALIZATION: 0

## 2024-05-26 PROCEDURE — 1090000002 HH PPS REVENUE DEBIT

## 2024-05-26 PROCEDURE — 1090000001 HH PPS REVENUE CREDIT

## 2024-05-27 PROCEDURE — 1090000002 HH PPS REVENUE DEBIT

## 2024-05-27 PROCEDURE — 1090000001 HH PPS REVENUE CREDIT

## 2024-05-28 PROCEDURE — 1090000001 HH PPS REVENUE CREDIT

## 2024-05-28 PROCEDURE — 1090000002 HH PPS REVENUE DEBIT

## 2024-05-29 PROCEDURE — 1090000002 HH PPS REVENUE DEBIT

## 2024-05-29 PROCEDURE — 1090000001 HH PPS REVENUE CREDIT

## 2024-05-30 PROCEDURE — 1090000001 HH PPS REVENUE CREDIT

## 2024-05-30 PROCEDURE — 1090000002 HH PPS REVENUE DEBIT

## 2024-05-31 PROCEDURE — 1090000002 HH PPS REVENUE DEBIT

## 2024-05-31 PROCEDURE — 1090000001 HH PPS REVENUE CREDIT

## 2024-06-01 PROCEDURE — 1090000002 HH PPS REVENUE DEBIT

## 2024-06-01 PROCEDURE — 1090000001 HH PPS REVENUE CREDIT

## 2024-06-02 PROCEDURE — 1090000002 HH PPS REVENUE DEBIT

## 2024-06-02 PROCEDURE — 1090000001 HH PPS REVENUE CREDIT

## 2024-06-03 PROCEDURE — 1090000002 HH PPS REVENUE DEBIT

## 2024-06-03 PROCEDURE — 1090000001 HH PPS REVENUE CREDIT

## 2024-06-04 PROCEDURE — 1090000001 HH PPS REVENUE CREDIT

## 2024-06-04 PROCEDURE — 1090000002 HH PPS REVENUE DEBIT

## 2024-06-05 PROCEDURE — 1090000002 HH PPS REVENUE DEBIT

## 2024-06-05 PROCEDURE — 1090000001 HH PPS REVENUE CREDIT

## 2024-06-06 PROCEDURE — 1090000002 HH PPS REVENUE DEBIT

## 2024-06-06 PROCEDURE — 1090000001 HH PPS REVENUE CREDIT

## 2024-06-07 ENCOUNTER — SPECIALTY PHARMACY (OUTPATIENT)
Dept: NEUROLOGY | Facility: HOSPITAL | Age: 64
End: 2024-06-07
Payer: MEDICARE

## 2024-06-07 DIAGNOSIS — G35 MULTIPLE SCLEROSIS (MULTI): Primary | ICD-10-CM

## 2024-06-07 PROCEDURE — 1090000001 HH PPS REVENUE CREDIT

## 2024-06-07 PROCEDURE — 1090000002 HH PPS REVENUE DEBIT

## 2024-06-07 NOTE — PROGRESS NOTES
[facility] Clinical Note    Chery Emery is a 64 y.o. female, who is on the specialty pharmacy service of: [facility].  Chery Emery is taking: Zeposia 0.92 mg caps.     Chery was contacted on 6/7/2024.    Refer to the encounter summary report for documentation details about patient counseling and education.      Impression/Plan  Is patient high risk? (potential patients:  pregnancy, geriatric, pediatric)   no  Is laboratory follow-up needed? Yes, labs ordered  Is a clinical intervention needed?  no  Next assessment date?  9/5/2024  Additional comments: n/a    Medication Adherence  The importance of adherence was discussed with the patient and they were advised to take the medication as prescribed by their provider. Chery was encouraged to call her physician's office if they have a question regarding a missed dose.     QOL/Patient Satisfaction  Rate your quality of life on scale of 1-10: 7  Rate your satisfaction with  Specialty Pharmacy on scale of 1-10: 10 - Completely satisfied      Patient advised to contact the pharmacy if there are any changes to her medication list, including prescriptions, OTC medications, herbal products, or supplements. Patient was advised of [facility]’s dispensing process, refill timeline, contact information ([phone]), and patient management follow up. Patient confirmed understanding of education conducted during assessment. All patient questions and concerns were addressed to the best of my ability. Patient was encouraged to contact the specialty pharmacy with any questions or concerns.    Confirmed follow-up outreaches are properly scheduled. Reviewed goals of therapy in the program targets.    Wilner Galloway, PharmD

## 2024-06-08 PROCEDURE — 1090000001 HH PPS REVENUE CREDIT

## 2024-06-08 PROCEDURE — 1090000002 HH PPS REVENUE DEBIT

## 2024-06-09 PROCEDURE — 1090000001 HH PPS REVENUE CREDIT

## 2024-06-09 PROCEDURE — 1090000002 HH PPS REVENUE DEBIT

## 2024-06-10 PROCEDURE — 1090000002 HH PPS REVENUE DEBIT

## 2024-06-10 PROCEDURE — 1090000001 HH PPS REVENUE CREDIT

## 2024-06-12 ENCOUNTER — LAB (OUTPATIENT)
Dept: LAB | Facility: LAB | Age: 64
End: 2024-06-12
Payer: MEDICARE

## 2024-06-12 DIAGNOSIS — G35 MULTIPLE SCLEROSIS (MULTI): ICD-10-CM

## 2024-06-12 LAB
ALBUMIN SERPL BCP-MCNC: 4.3 G/DL (ref 3.4–5)
ALP SERPL-CCNC: 44 U/L (ref 33–136)
ALT SERPL W P-5'-P-CCNC: 10 U/L (ref 7–45)
ANION GAP SERPL CALC-SCNC: 11 MMOL/L (ref 10–20)
AST SERPL W P-5'-P-CCNC: 16 U/L (ref 9–39)
BASOPHILS # BLD AUTO: 0.02 X10*3/UL (ref 0–0.1)
BASOPHILS NFR BLD AUTO: 0.5 %
BILIRUB SERPL-MCNC: 0.6 MG/DL (ref 0–1.2)
BUN SERPL-MCNC: 20 MG/DL (ref 6–23)
CALCIUM SERPL-MCNC: 9.8 MG/DL (ref 8.6–10.6)
CHLORIDE SERPL-SCNC: 104 MMOL/L (ref 98–107)
CO2 SERPL-SCNC: 30 MMOL/L (ref 21–32)
CREAT SERPL-MCNC: 0.74 MG/DL (ref 0.5–1.05)
EGFRCR SERPLBLD CKD-EPI 2021: 89 ML/MIN/1.73M*2
EOSINOPHIL # BLD AUTO: 0.02 X10*3/UL (ref 0–0.7)
EOSINOPHIL NFR BLD AUTO: 0.5 %
ERYTHROCYTE [DISTWIDTH] IN BLOOD BY AUTOMATED COUNT: 13.4 % (ref 11.5–14.5)
GLUCOSE SERPL-MCNC: 98 MG/DL (ref 74–99)
HCT VFR BLD AUTO: 45.1 % (ref 36–46)
HGB BLD-MCNC: 14.5 G/DL (ref 12–16)
IMM GRANULOCYTES # BLD AUTO: 0.02 X10*3/UL (ref 0–0.7)
IMM GRANULOCYTES NFR BLD AUTO: 0.5 % (ref 0–0.9)
LYMPHOCYTES # BLD AUTO: 0.8 X10*3/UL (ref 1.2–4.8)
LYMPHOCYTES NFR BLD AUTO: 21.6 %
MCH RBC QN AUTO: 29.9 PG (ref 26–34)
MCHC RBC AUTO-ENTMCNC: 32.2 G/DL (ref 32–36)
MCV RBC AUTO: 93 FL (ref 80–100)
MONOCYTES # BLD AUTO: 0.53 X10*3/UL (ref 0.1–1)
MONOCYTES NFR BLD AUTO: 14.3 %
NEUTROPHILS # BLD AUTO: 2.32 X10*3/UL (ref 1.2–7.7)
NEUTROPHILS NFR BLD AUTO: 62.6 %
NRBC BLD-RTO: 0 /100 WBCS (ref 0–0)
PLATELET # BLD AUTO: 212 X10*3/UL (ref 150–450)
POTASSIUM SERPL-SCNC: 4.1 MMOL/L (ref 3.5–5.3)
PROT SERPL-MCNC: 7.4 G/DL (ref 6.4–8.2)
RBC # BLD AUTO: 4.85 X10*6/UL (ref 4–5.2)
SODIUM SERPL-SCNC: 141 MMOL/L (ref 136–145)
WBC # BLD AUTO: 3.7 X10*3/UL (ref 4.4–11.3)

## 2024-06-12 PROCEDURE — 80053 COMPREHEN METABOLIC PANEL: CPT

## 2024-06-12 PROCEDURE — 36415 COLL VENOUS BLD VENIPUNCTURE: CPT

## 2024-06-12 PROCEDURE — 85025 COMPLETE CBC W/AUTO DIFF WBC: CPT

## 2024-06-17 ENCOUNTER — HOME CARE VISIT (OUTPATIENT)
Dept: HOME HEALTH SERVICES | Facility: HOME HEALTH | Age: 64
End: 2024-06-17
Payer: MEDICARE

## 2024-06-17 PROCEDURE — G0151 HHCP-SERV OF PT,EA 15 MIN: HCPCS | Mod: HHH

## 2024-06-18 VITALS — TEMPERATURE: 98.6 F | OXYGEN SATURATION: 98 % | RESPIRATION RATE: 14 BRPM

## 2024-06-18 SDOH — HEALTH STABILITY: PHYSICAL HEALTH: EXERCISE TYPE: DEVELOPMENTAL SEQUENCE

## 2024-06-18 ASSESSMENT — PAIN SCALES - PAIN ASSESSMENT IN ADVANCED DEMENTIA (PAINAD)
NEGVOCALIZATION: 0
FACIALEXPRESSION: 2
CONSOLABILITY: 1 - DISTRACTED OR REASSURED BY VOICE OR TOUCH.
NEGVOCALIZATION: 0 - NONE.
CONSOLABILITY: 1
BODYLANGUAGE: 1
FACIALEXPRESSION: 2 - FACIAL GRIMACING.
TOTALSCORE: 4
BREATHING: 0
BODYLANGUAGE: 1 - TENSE. DISTRESSED PACING. FIDGETING.

## 2024-06-18 ASSESSMENT — ACTIVITIES OF DAILY LIVING (ADL)
ENTERING_EXITING_HOME: MINIMUM ASSIST
AMBULATION ASSISTANCE: STAND BY ASSIST
OASIS_M1830: 01
AMBULATION ASSISTANCE ON FLAT SURFACES: 1
CURRENT_FUNCTION: STAND BY ASSIST

## 2024-06-18 ASSESSMENT — ENCOUNTER SYMPTOMS
DEPRESSION: 0
ARTHRALGIAS: 1
MUSCLE WEAKNESS: 1
HIGHEST PAIN SEVERITY IN PAST 24 HOURS: 8/10
PAIN LOCATION: RIGHT KNEE
PAIN LOCATION - PAIN SEVERITY: 7/10
PAIN LOCATION - EXACERBATING FACTORS: WEARING BRACE
OCCASIONAL FEELINGS OF UNSTEADINESS: 1
PERSON REPORTING PAIN: PATIENT
SUBJECTIVE PAIN PROGRESSION: GRADUALLY WORSENING
LOWEST PAIN SEVERITY IN PAST 24 HOURS: 3/10
LOSS OF SENSATION IN FEET: 1
PAIN: 1

## 2024-06-19 ENCOUNTER — SPECIALTY PHARMACY (OUTPATIENT)
Dept: PHARMACY | Facility: CLINIC | Age: 64
End: 2024-06-19

## 2024-06-19 PROCEDURE — RXMED WILLOW AMBULATORY MEDICATION CHARGE

## 2024-06-26 ENCOUNTER — PHARMACY VISIT (OUTPATIENT)
Dept: PHARMACY | Facility: CLINIC | Age: 64
End: 2024-06-26
Payer: COMMERCIAL

## 2024-06-26 ENCOUNTER — HOME CARE VISIT (OUTPATIENT)
Dept: HOME HEALTH SERVICES | Facility: HOME HEALTH | Age: 64
End: 2024-06-26
Payer: MEDICARE

## 2024-06-26 VITALS — OXYGEN SATURATION: 98 % | HEART RATE: 87 BPM | TEMPERATURE: 98.6 F | RESPIRATION RATE: 14 BRPM

## 2024-06-26 PROCEDURE — G0151 HHCP-SERV OF PT,EA 15 MIN: HCPCS | Mod: HHH

## 2024-06-26 SDOH — HEALTH STABILITY: PHYSICAL HEALTH: EXERCISE TYPE: UPDATED HEP

## 2024-06-26 ASSESSMENT — PAIN SCALES - PAIN ASSESSMENT IN ADVANCED DEMENTIA (PAINAD)
FACIALEXPRESSION: 0 - SMILING OR INEXPRESSIVE.
BODYLANGUAGE: 0 - RELAXED.
CONSOLABILITY: 0
BREATHING: 0
FACIALEXPRESSION: 0
NEGVOCALIZATION: 0 - NONE.
NEGVOCALIZATION: 0
BODYLANGUAGE: 0
CONSOLABILITY: 0 - NO NEED TO CONSOLE.
TOTALSCORE: 0

## 2024-06-26 ASSESSMENT — ENCOUNTER SYMPTOMS
LOWEST PAIN SEVERITY IN PAST 24 HOURS: 1/10
OCCASIONAL FEELINGS OF UNSTEADINESS: 1
PAIN LOCATION: LEFT KNEE
PERSON REPORTING PAIN: PATIENT
PAIN: 1
SUBJECTIVE PAIN PROGRESSION: GRADUALLY IMPROVING
MUSCLE WEAKNESS: 1
PAIN LOCATION - PAIN SEVERITY: 6/10
PAIN SEVERITY GOAL: 1/10

## 2024-07-02 ENCOUNTER — HOME CARE VISIT (OUTPATIENT)
Dept: HOME HEALTH SERVICES | Facility: HOME HEALTH | Age: 64
End: 2024-07-02
Payer: MEDICARE

## 2024-07-02 PROCEDURE — G0151 HHCP-SERV OF PT,EA 15 MIN: HCPCS | Mod: HHH

## 2024-07-03 SDOH — HEALTH STABILITY: PHYSICAL HEALTH: EXERCISE TYPE: FLOOR EXERCISE PROGRAM

## 2024-07-03 ASSESSMENT — ENCOUNTER SYMPTOMS
MUSCLE WEAKNESS: 1
SUBJECTIVE PAIN PROGRESSION: GRADUALLY IMPROVING
PAIN LOCATION: LEFT KNEE
PAIN LOCATION - PAIN QUALITY: ACHING
ARTHRALGIAS: 1
PERSON REPORTING PAIN: PATIENT
PAIN: 1
HIGHEST PAIN SEVERITY IN PAST 24 HOURS: 5/10
OCCASIONAL FEELINGS OF UNSTEADINESS: 0
LOWEST PAIN SEVERITY IN PAST 24 HOURS: 1/10
PAIN LOCATION - PAIN SEVERITY: 4/10

## 2024-07-03 ASSESSMENT — PAIN SCALES - PAIN ASSESSMENT IN ADVANCED DEMENTIA (PAINAD)
TOTALSCORE: 2
BODYLANGUAGE: 0 - RELAXED.
FACIALEXPRESSION: 2
CONSOLABILITY: 0
BREATHING: 0
NEGVOCALIZATION: 0
FACIALEXPRESSION: 2 - FACIAL GRIMACING.
CONSOLABILITY: 0 - NO NEED TO CONSOLE.
BODYLANGUAGE: 0
NEGVOCALIZATION: 0 - NONE.

## 2024-07-03 ASSESSMENT — ACTIVITIES OF DAILY LIVING (ADL)
AMBULATION ASSISTANCE ON FLAT SURFACES: 1
CURRENT_FUNCTION: STAND BY ASSIST
OASIS_M1830: 01
HOME_HEALTH_OASIS: 00

## 2024-07-18 ENCOUNTER — SPECIALTY PHARMACY (OUTPATIENT)
Dept: PHARMACY | Facility: CLINIC | Age: 64
End: 2024-07-18

## 2024-07-18 PROCEDURE — RXMED WILLOW AMBULATORY MEDICATION CHARGE

## 2024-07-23 ENCOUNTER — PHARMACY VISIT (OUTPATIENT)
Dept: PHARMACY | Facility: CLINIC | Age: 64
End: 2024-07-23
Payer: COMMERCIAL

## 2024-08-09 DIAGNOSIS — G35 MULTIPLE SCLEROSIS (MULTI): ICD-10-CM

## 2024-08-13 PROCEDURE — RXMED WILLOW AMBULATORY MEDICATION CHARGE

## 2024-08-14 ENCOUNTER — SPECIALTY PHARMACY (OUTPATIENT)
Dept: PHARMACY | Facility: CLINIC | Age: 64
End: 2024-08-14

## 2024-08-15 ENCOUNTER — PHARMACY VISIT (OUTPATIENT)
Dept: PHARMACY | Facility: CLINIC | Age: 64
End: 2024-08-15
Payer: COMMERCIAL

## 2024-08-15 ENCOUNTER — OFFICE VISIT (OUTPATIENT)
Dept: NEUROLOGY | Facility: HOSPITAL | Age: 64
End: 2024-08-15
Payer: MEDICARE

## 2024-08-15 VITALS
WEIGHT: 105 LBS | RESPIRATION RATE: 18 BRPM | DIASTOLIC BLOOD PRESSURE: 79 MMHG | TEMPERATURE: 97.1 F | HEIGHT: 64 IN | HEART RATE: 93 BPM | SYSTOLIC BLOOD PRESSURE: 121 MMHG | BODY MASS INDEX: 17.93 KG/M2

## 2024-08-15 DIAGNOSIS — G35 MULTIPLE SCLEROSIS (MULTI): ICD-10-CM

## 2024-08-15 PROCEDURE — 3008F BODY MASS INDEX DOCD: CPT | Performed by: PSYCHIATRY & NEUROLOGY

## 2024-08-15 PROCEDURE — 1125F AMNT PAIN NOTED PAIN PRSNT: CPT | Performed by: PSYCHIATRY & NEUROLOGY

## 2024-08-15 PROCEDURE — RXMED WILLOW AMBULATORY MEDICATION CHARGE

## 2024-08-15 PROCEDURE — 99213 OFFICE O/P EST LOW 20 MIN: CPT | Performed by: PSYCHIATRY & NEUROLOGY

## 2024-08-15 PROCEDURE — 1036F TOBACCO NON-USER: CPT | Performed by: PSYCHIATRY & NEUROLOGY

## 2024-08-15 RX ORDER — TIZANIDINE 2 MG/1
2 TABLET ORAL DAILY
Qty: 90 TABLET | Refills: 3 | Status: SHIPPED | OUTPATIENT
Start: 2024-08-15 | End: 2025-08-10

## 2024-08-15 RX ORDER — ERGOCALCIFEROL 1.25 MG/1
1.25 CAPSULE ORAL
Qty: 4 CAPSULE | Refills: 11 | Status: SHIPPED | OUTPATIENT
Start: 2024-08-18 | End: 2025-08-18

## 2024-08-15 ASSESSMENT — PAIN SCALES - GENERAL: PAINLEVEL: 7

## 2024-08-15 NOTE — PROGRESS NOTES
Chief Complaint  MS   Neurologic Evaluation.      History of Present Illness     Neuro - Immunology   Date of onset: 2014   Date of diagnosis: 05/03/2022   Last MRI brain: 4/2024   Last MRI cervical: 1/2023   Last MRI thoracic: 1/2023   Last CBC (Complete Blood Count): 2/2023~   SPMS with progression.      Disease Course at Onset: RR.   Disease Course Now: progressive without relapses.   Current DMT (Disease Modifying Therapies): zeposia since 2/2024  Previous DMT (Disease Modifying Therapies): Ocrevus (from 8/2022 to 2/2024 then switched to zeposia due to recurrent infections).   CSF (Cerebrospinal Fluid): not done.   JCV (Mejia Cunningham Virus): Positive 5/2022, index 3.2.   VZV (Varicella Zoster Virus): Positive 5/2022.   Hep Panel: negative 5/2022.   NMO (Neuromyelitis Optica): negative 12/2021.   MOG (Myelin Oligodendrocyte Glycoprotein): negative 2/2021.     Narrative HPI:   This is a 61 yo left handed AAF with hx of brief remote smoking who presents for MS follow up:     Interval hx:  Switched to zeposia in February due to recurrent infections with ocrevus. Developed two episodes of right eye redness with normal vision after starting zeposia that self-resolved after a few days. No other side effects. Stopped tiznidine and continues to have occasional spasms in the LLE. AFO did not fit her and PT recommends a [place] knee cage instead. Walking speed slowed down. MRI in April was stable.       MS Symptom Review:   Weakness:~acute BLE weakness in 2014 followed by progressive weakness since 2018.   Sensory changes:~BLE tingling.   Incoordination:   Falling:~once every 3 months because of tripping over the left foot.   Gait Change:~using a cane since 2018.   No painful vision loss.   No double vision.   Vertigo:~positional only resolved with Ebly.   No facial/bulbar weakness.~but had an episode of severe pain in the left cheek and inability to move the jaw to talk or eat thought to be TMJ.   No Lhermitte's.    Bladder/bowel dysfunction:~urgency with occasional urge incontinence.   Spasticity:~yes legs feel stiff.   No tonic spasms.   Tremors:~yes left hand, action tremor.   RLS (Restless Leg Syndrome):~yes both legs since 2021.   Dystonia:~bilateral food dystonia increased arching of the foot with inversion, both feet do not get affected simultaneously, in the morning, 2-3 times a week, painful, lasting for a few seconds.   Other movement disorders:~R>L leg jerking, painless, rare, no triggers, no impact.   No heat sensitivity.   Fatigue:   No depression.   Anxiety:   No cognitive changes.   DMT (Disease Modifying Therapies): zeposia since 2/2024.   Vitamin D: was on vitD 67968 units weekly but ran out.   Symptomatic:~Myrbetriq for urinary symptoms. Stopped tizanidine     Review of Systems  All systems reviewed and are negative except as mentioned in the HPI.        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)   · Ataxia (781.3) (R27.0)   · Cervical myelopathy (721.1) (G95.9)   · Dizziness (780.4) (R42)   · Foot pain, bilateral (729.5) (M79.671,M79.672)   · Immunosuppression due to drug therapy (V58.69) (D84.821,Z79.899)   · Impacted cerumen of both ears (380.4) (H61.23)   · Metatarsalgia of both feet (726.70) (M77.41,M77.42)   · Multiple sclerosis, secondary progressive (340) (G35)   · Otalgia, bilateral (388.70) (H92.03)   · Pain in both feet (729.5) (M79.671,M79.672)   · Paresthesia of both feet (782.0) (R20.2)   · Positive GAURI (antinuclear antibody) (795.79) (R76.8)   · Positive colorectal cancer screening using Cologuard test (787.7) (R19.5)   · Screening for cervical cancer (V76.2) (Z12.4)   · Sensation of fullness in both ears (388.8) (H93.8X3)   · Sensory urge incontinence (788.31) (N39.41)   · Subjective tinnitus, bilateral (388.31) (H93.13)   · TMJ pain dysfunction syndrome (524.69) (M26.629)   · Unsteadiness on feet (781.2) (R26.81)   · Urinary incontinence (788.30) (R32)   · Weakness  of extremity (729.89) (R29.898)   · Well woman exam (V72.31) (Z01.419)     Past Medical History  Problems    · Multiple sclerosis, secondary progressive (340) (G35)   · Sensory urge incontinence (788.31) (N39.41)     Surgical History  Problems    · History of Arm surgery   · History of Oral surgery   · History of Tubal ligation     Family History  Mother    · Family history of rheumatoid arthritis (V17.7) (Z82.61)     Social History  Problems    · Always uses seat belt   · Former smoker (V15.82) (Z87.891)   · No alcohol use     Allergies  Medication    · Penicillins   Hives; burning sensation; Itching; Updated By: Shahab Bay; 5/16/2022 9:27:57 AM   · sulfa   Hives; burning sensation; Itching; Updated By: Shahab Bay; 5/16/2022 9:27:57 AM   · Benadryl CREA   skin blistered up; Recorded By: Shahab Bay; 5/17/2022 9:23:44 AM   · Cipro   Nausea; Updated By: Shahab Bay; 5/16/2022 9:27:57 AM   · codeine   Nausea; Updated By: Shahab Bay; 5/16/2022 9:27:57 AM   · Flagyl   Nausea; Updated By: Shahab Bay; 5/16/2022 9:27:57 AM          Physical Exam  Multiple sclerosis Functional Composite (MSFC):     25 foot walk: 25.3 s compared to 14.6 s compared to 14.8 s compared to 16.8 s compared to 20 s compared to 24 s     9-PEG-HOLE:      Right not done   Left not done      PST test: not done   Level of education:  Raw Score:  Adjusted Score                                                                                                       Estimated EDSS:     Constitutional: General appearance: no acute distress   Mental status: The patient was in no distress, alert, interactive and cooperative. Affect is appropriate.   Orientation: oriented to person,~oriented to place~and~oriented to time.   Memory: recent memory intact~and~remote memory intact.   Attention: normal attention span~and~normal concentrating ability.   Language: normal comprehension~and~no difficulty naming common objects.   Fund of knowledge:  Patient displays adequate knowledge of current events,~adequate fund of knowledge regarding past history~and~adequate fund of knowledge regarding vocabulary.   Cranial nerve II: Visual fields full to confrontation.~red desaturation left eye. no RAPD but left pupil sluggish in reaction.   Cranial nerves III, IV, and VI: Pupils round, equally reactive to light; no ptosis. EOMs intact. No nystagmus.~no Daren.   Cranial Nerve V: Facial sensation intact bilaterally.   Cranial nerve VII: Normal and symmetric facial strength.   Cranial nerve VIII: Hearing is intact bilaterally to finger rub / whisper.   Cranial nerves IX and X: Palate elevates symmetrically.   Cranial nerve XI: Shoulder shrug and neck rotation strength are intact.   Cranial nerve XII: Tongue midline with normal strength.   Motor: Muscle bulk was normal in both upper and lower extremities.~moderate spasticity BLE more on the left.~3-4/5 LLE, 4+/5 RLE.~moderate postural/action tremor of the left hand.   Deep Tendon Reflexes: left biceps 3+,~right biceps 3+,~left triceps 2+,~right triceps 2+,~left brachioradialis 3+,~right brachioradialis 3+,~left patella 4+,~right patella 4+,~left ankle jerk 3+,~right ankle jerk 3+   Sensory Exam:. left hemicord syndrome and distal reduction of vibration sense.   Coordination: There is no limb dystaxia and rapid alternating movements are intact.   Gait:. left cirumduction with a walker.      Scores and Scales     Pyramidal Functions: (3) Mild to moderate paraparesis or hemiparesis (detectable weakness but most function sustained for short periods, fatigue a problem); severe monoparesis (almost no function).   Cerebellar Functions: (3) Moderate truncal or limb ataxia (tremor or clumsy movements interfere with function in all spheres).   Brainstem Functions: (0) Normal.      Sensory Function: Moderate decrease in touch or pain or position sense, and/or moderate decrease in vibration in one or two limbs; or vibratory (c/s figure  writing) decrease alone in three or four limbs.                     Bowel and Bladder Function: (3) Frequent urinary incontinence.      Visual Function: (0) Normal.     Provider Impressions     Neuro - Immunology Assessment: This is a 63 year old AA,~left - handed female, with PMH of: remote brief smoking who presents: with acute transient BLE weakness in 2014 followed by gradual and progressive BLE weakness with gait and bladder dysfunction since 2018.   The Neurological Exam showed: BLE spastic paraparesis, LUE postural/action tremor, sluggish left pupile, and red desaturation OS.   MRI Showed: non-enhancing typical demyelinating plaques in periventricular and cervical locations with heaviest burden in the cervical cord.   CSF (Cerebrospinal Fluid): not done and not needed.   OCT/VEP: not done.   MS Mimics: ruled out including negative AQP4 and MOG.   The Overall Picture is Suggestive of: SPMS with progression and without clinical activity. I'm unable to  radiological activity in the absence of longitudinal scans.   DMT (Disease Modifying Therapies): Ocrevus (started in 8/2022).         Interval Assessment:   12/5/2022: Started Ocrevus in 8/2022. Tolerated first two doses well. Due for next dose in March 2023. No labs or MRI to review since last visit. Taking Vit D weekly. Started on Myrbetriq by urology for her urinary symptoms. Started using AFO for her dropfoot, which she says helps the foot clear the floor. Using a walker to ambulate. 25ft walk in 20s today, with walker. Moderate spasticity in her LEs on exam. will start baclofen. will move MRI to March     3/6/2023: Continues to tolerate Ocrevus well. First maintenance dose of Ocrevus was 2/27/23. Bcells at 3%, will monitor for early repopulation in future. Wasnt able to tolerate baclofen due to LE weakness and drowsiness. Presented to the ED in 1/23 with LE neuropathic pain. Was given 7 days of gabapentin, which was helpful. Had MRI brain/spine in ED,  which was unremarkable for new or active lesions but showed multiple disc prolapse on lumbar MRI. Neuropathic pain has since resolved without gabapentin. Still having some spasticity in both her LE so will try tizanidine. Hasnt been using her AFO recently. 25ft walk of 16.8s improved from last visit.     The patient is presenting with left foot drop and left knee genu recurvatum, leading to increased fall risk and increased energy expenditure during functional mobility. The patient is ambulatory and will benefit from utilization of an AFO on the left side at all times during ambulation to increase foot clearance and reduce fall risk during ambulation.     08/17/2023: No new neurological symptoms. had two back to back UTIs in June and July. The last UTI required hospital admission for one day. she hasn't been taking her myrbetriq regularly. New MRI without any new or active lesions. 25-F-W keeps improving. will have her follow up with urology given recurrent UTIs. she hasn't started tizanidine yet and should start now.     2/15/2024: Had another hospital admission for a perirectal abscess in November. She also felt weaker after her latest ocrevus infusion. She no longer wants to take ocrevus because of the recurrent infections. She wants to try something with less infection risk. Notably, her last IGG level in August was normal (1810) and last ALC was also normal (1.05). she reports left knee pain that responded to a local steroid injection. I discussed that all other DMTs may also increase the risk of infection although possibly less so than ocrevus. After a prolonged discussion, we mutually agreed to switch to zeposia after EKG and updated blood work. Had some benefit from tizanidine.     8/15/2024: Switched to zeposia in February due to recurrent infections with ocrevus. Developed two episodes of right eye redness with normal vision after starting zeposia that self-resolved after a few days. No other side effects.  Labs from June show mild leukopenia (3.7) and lymphopenia (0.8) with normal liver enzymes. Stopped tiznidine and continues to have occasional spasms in the LLE. AFO did not fit her and PT recommends a [place] knee cage instead. Walking speed slowed down. MRI in April was stable. She will apply for disability. Will resume tizanidine and vitamin D.        Plan: SPMS was discussed with the patient (and family if present) in detail including pathogenesis, clinical picture, complications, course, prognosis, monitoring strategy, and management options. All questions answered.   Acute Relapse Management: not indicated.   DMT (Disease Modifying Therapies): continue zeposia  Will Order Blood Work For: CBC diff, CMP, and IgG  Will Order MRI of: the brain WWO to be done in October 2024 for monitoring.   Symptomatic Management: restart tizanidine. continue gabapentin. may need ampyra in future.   Vitamin D: restart 66765 units weekly.   Smoking: non smoker.   PT/OT: continue.   Instructions: Keep an active lifestyle and develop exercise routine as tolerated. Recommend a balanced healthy diet. Avoid excessive amounts of salt, carbohydrates, fat, and red meat. Increase intake of fruits, vegetables, and white meat.   Follow-Up: in Octoberafter next MRI.     Cooper Escalona MD

## 2024-08-15 NOTE — PATIENT INSTRUCTIONS
I'm glad you tolerated the switch from ocrevus to zeposia well. Your blood work from June was good and does not show unexpected side effects from zeposia.     Please resume tizanidine again to address spasms (2 mg at night)    Please resume vitamin D 90738 units weekly.     I will order the knee brace for you. Please take the order to your therapist.     Repeat brain MRI in October then follow up again with me.     Thank you for visiting the clinic today    Cooper Escalona MD

## 2024-08-17 ENCOUNTER — PHARMACY VISIT (OUTPATIENT)
Dept: PHARMACY | Facility: CLINIC | Age: 64
End: 2024-08-17
Payer: COMMERCIAL

## 2024-08-30 ENCOUNTER — TELEPHONE (OUTPATIENT)
Dept: NEUROLOGY | Facility: CLINIC | Age: 64
End: 2024-08-30
Payer: MEDICARE

## 2024-08-30 NOTE — TELEPHONE ENCOUNTER
Luisa , from [facility] sent a fax requesting an order for a LEFT AFO. Please let me know when done and I will fax to  [facility] [phone]

## 2024-09-06 ENCOUNTER — SPECIALTY PHARMACY (OUTPATIENT)
Dept: NEUROLOGY | Facility: HOSPITAL | Age: 64
End: 2024-09-06
Payer: MEDICARE

## 2024-09-06 NOTE — PROGRESS NOTES
[facility] Clinical Note    Chery Emery is a 64 y.o. female, who is on the specialty pharmacy service for management of:  Multiple Sclerosis Core.    Chery Emery is taking: Zeposia 0.92 mg capsule.    Chery was contacted on 9/6/2024 at 8:30 AM for a virtual pharmacy visit with encounter number [phone] from:   [place] [address]  [facility] 5TH FLOOR  [facility] [address]  Dept: [phone]  Dept Fax: [phone]  Loc: [phone]    Chery was offered a Telemedicine Video visit or Telephone visit.  Chery consented to a telephone visit, which was performed.    The most recent encounter visit with the referring prescriber Cooper Escalona MD on 08/15/2024 was reviewed.  Pharmacy will continue to collaborate in the care of this patient with the referring prescriber Cooper Escalona MD.    General Assessment      Impression/Plan  IMPRESSION/PLAN:  Is patient high risk (potential patients:  pregnancy, geriatric, pediatric)?  No  Is laboratory follow-up needed? No  Is a clinical intervention needed? No  Next reassessment date? ~12/5/2024  Additional comments: Reminded patient to schedule MRI    Refer to the encounter summary report for documentation details about patient counseling and education.      Medication Adherence    The importance of adherence was discussed with the patient and they were advised to take the medication as prescribed by their provider. Patient was encouraged to call their physician's office if they have a question regarding a missed dose.     QOL/Patient Satisfaction  Rate your quality of life on scale of 1-10: 8  Rate your satisfaction with  Specialty Pharmacy on scale of 1-10: 10 - Completely satisfied      Patient was advised to contact the pharmacy if there are any changes to their medication list, including prescriptions, OTC medications, herbal products, or supplements. Patient was advised  [facility]'s dispensing process, refill timeline, contact information ([phone]), and patient management follow up. Patient confirmed understanding of education conducted during assessment. All patient questions and concerns were addressed to the best of my ability. Patient was encouraged to contact the specialty pharmacy with any questions or concerns.    Confirmed follow-up outreaches are properly scheduled and reviewed goals of therapy with the patient.        NEAL STEPHENS, PharmD

## 2024-09-13 DIAGNOSIS — G35 MULTIPLE SCLEROSIS (MULTI): Primary | ICD-10-CM

## 2024-09-15 ENCOUNTER — SPECIALTY PHARMACY (OUTPATIENT)
Dept: PHARMACY | Facility: CLINIC | Age: 64
End: 2024-09-15

## 2024-09-18 ENCOUNTER — SPECIALTY PHARMACY (OUTPATIENT)
Dept: PHARMACY | Facility: CLINIC | Age: 64
End: 2024-09-18

## 2024-09-18 PROCEDURE — RXMED WILLOW AMBULATORY MEDICATION CHARGE

## 2024-09-19 ENCOUNTER — PHARMACY VISIT (OUTPATIENT)
Dept: PHARMACY | Facility: CLINIC | Age: 64
End: 2024-09-19
Payer: COMMERCIAL

## 2024-10-11 ENCOUNTER — SPECIALTY PHARMACY (OUTPATIENT)
Dept: PHARMACY | Facility: CLINIC | Age: 64
End: 2024-10-11

## 2024-10-11 PROCEDURE — RXMED WILLOW AMBULATORY MEDICATION CHARGE

## 2024-10-14 ENCOUNTER — PHARMACY VISIT (OUTPATIENT)
Dept: PHARMACY | Facility: CLINIC | Age: 64
End: 2024-10-14
Payer: COMMERCIAL

## 2024-11-12 ENCOUNTER — SPECIALTY PHARMACY (OUTPATIENT)
Dept: PHARMACY | Facility: CLINIC | Age: 64
End: 2024-11-12

## 2024-11-12 PROCEDURE — RXMED WILLOW AMBULATORY MEDICATION CHARGE

## 2024-11-14 ENCOUNTER — PHARMACY VISIT (OUTPATIENT)
Dept: PHARMACY | Facility: CLINIC | Age: 64
End: 2024-11-14
Payer: COMMERCIAL

## 2024-12-04 ENCOUNTER — SPECIALTY PHARMACY (OUTPATIENT)
Dept: NEUROLOGY | Facility: HOSPITAL | Age: 64
End: 2024-12-04
Payer: MEDICARE

## 2024-12-04 NOTE — PROGRESS NOTES
[facility] Clinical Note    Chery Emery is a 64 y.o. female, who is on the specialty pharmacy service for management of:  Multiple Sclerosis Core.    Chery Emery is taking: Zeposia 0.92 mg capsules.      Chery was contacted on 12/4/2024 at 1:13 PM for a virtual pharmacy visit with encounter number [id number] from:   [place] [address]  [facility] 5TH FLOOR  [facility] [address]  Dept: [phone]  Dept Fax: [phone]  Loc: [phone]    Chery was offered a Telemedicine Video visit or Telephone visit.  Chery consented to a telephone visit, which was performed.    The most recent encounter visit with the referring prescriber Cooper Escalona MD on 8/15/2024 was reviewed.  Pharmacy will continue to collaborate in the care of this patient with the referring prescriber Cooper Escalona MD.    General Assessment      Impression/Plan  IMPRESSION/PLAN:  Is patient high risk (potential patients:  pregnancy, geriatric, pediatric)?  no  Is laboratory follow-up needed? no  Is a clinical intervention needed? no  Next reassessment date? 3/3/2025  Additional comments: n/a    Refer to the encounter summary report for documentation details about patient counseling and education.      Medication Adherence    The importance of adherence was discussed with the patient and they were advised to take the medication as prescribed by their provider. Patient was encouraged to call their physician's office if they have a question regarding a missed dose.     QOL/Patient Satisfaction  Rate your quality of life on scale of 1-10: 7  Rate your satisfaction with  Specialty Pharmacy on scale of 1-10: 10 - Completely satisfied      Patient was advised to contact the pharmacy if there are any changes to their medication list, including prescriptions, OTC medications, herbal products, or supplements. Patient was advised of [facility]  Specialty Pharmacy's dispensing process, refill timeline, contact information ([phone]), and patient management follow up. Patient confirmed understanding of education conducted during assessment. All patient questions and concerns were addressed to the best of my ability. Patient was encouraged to contact the specialty pharmacy with any questions or concerns.    Confirmed follow-up outreaches are properly scheduled and reviewed goals of therapy with the patient.        Wilner Galloway, PharmD

## 2024-12-09 ENCOUNTER — APPOINTMENT (OUTPATIENT)
Dept: RADIOLOGY | Facility: CLINIC | Age: 64
End: 2024-12-09
Payer: MEDICARE

## 2024-12-11 ENCOUNTER — HOSPITAL ENCOUNTER (OUTPATIENT)
Dept: RADIOLOGY | Facility: CLINIC | Age: 64
End: 2024-12-11
Payer: MEDICARE

## 2024-12-11 PROCEDURE — RXMED WILLOW AMBULATORY MEDICATION CHARGE

## 2024-12-12 ENCOUNTER — SPECIALTY PHARMACY (OUTPATIENT)
Dept: PHARMACY | Facility: CLINIC | Age: 64
End: 2024-12-12

## 2024-12-13 ENCOUNTER — PHARMACY VISIT (OUTPATIENT)
Dept: PHARMACY | Facility: CLINIC | Age: 64
End: 2024-12-13
Payer: COMMERCIAL

## 2024-12-16 ENCOUNTER — HOSPITAL ENCOUNTER (OUTPATIENT)
Dept: RADIOLOGY | Facility: CLINIC | Age: 64
Discharge: HOME | End: 2024-12-16
Payer: MEDICARE

## 2024-12-16 DIAGNOSIS — G35 MULTIPLE SCLEROSIS (MULTI): ICD-10-CM

## 2024-12-16 PROCEDURE — 70553 MRI BRAIN STEM W/O & W/DYE: CPT

## 2024-12-16 PROCEDURE — A9575 INJ GADOTERATE MEGLUMI 0.1ML: HCPCS | Performed by: PSYCHIATRY & NEUROLOGY

## 2024-12-16 PROCEDURE — 2550000001 HC RX 255 CONTRASTS: Performed by: PSYCHIATRY & NEUROLOGY

## 2024-12-16 RX ORDER — GADOTERATE MEGLUMINE 376.9 MG/ML
9 INJECTION INTRAVENOUS
Status: COMPLETED | OUTPATIENT
Start: 2024-12-16 | End: 2024-12-16

## 2024-12-16 ASSESSMENT — ENCOUNTER SYMPTOMS
DEPRESSION: 0
OCCASIONAL FEELINGS OF UNSTEADINESS: 0
LOSS OF SENSATION IN FEET: 0

## 2025-01-06 PROCEDURE — RXMED WILLOW AMBULATORY MEDICATION CHARGE

## 2025-01-11 ENCOUNTER — SPECIALTY PHARMACY (OUTPATIENT)
Dept: PHARMACY | Facility: CLINIC | Age: 65
End: 2025-01-11

## 2025-01-15 ENCOUNTER — PHARMACY VISIT (OUTPATIENT)
Dept: PHARMACY | Facility: CLINIC | Age: 65
End: 2025-01-15
Payer: COMMERCIAL

## 2025-02-06 ENCOUNTER — OFFICE VISIT (OUTPATIENT)
Dept: NEUROLOGY | Facility: HOSPITAL | Age: 65
End: 2025-02-06
Payer: MEDICARE

## 2025-02-06 VITALS
DIASTOLIC BLOOD PRESSURE: 78 MMHG | RESPIRATION RATE: 18 BRPM | BODY MASS INDEX: 17.58 KG/M2 | TEMPERATURE: 96.4 F | WEIGHT: 103 LBS | HEART RATE: 99 BPM | SYSTOLIC BLOOD PRESSURE: 116 MMHG | HEIGHT: 64 IN

## 2025-02-06 DIAGNOSIS — G35 MULTIPLE SCLEROSIS (MULTI): ICD-10-CM

## 2025-02-06 PROCEDURE — 1159F MED LIST DOCD IN RCRD: CPT | Performed by: PSYCHIATRY & NEUROLOGY

## 2025-02-06 PROCEDURE — 3008F BODY MASS INDEX DOCD: CPT | Performed by: PSYCHIATRY & NEUROLOGY

## 2025-02-06 PROCEDURE — 99214 OFFICE O/P EST MOD 30 MIN: CPT | Performed by: PSYCHIATRY & NEUROLOGY

## 2025-02-06 PROCEDURE — 1036F TOBACCO NON-USER: CPT | Performed by: PSYCHIATRY & NEUROLOGY

## 2025-02-06 PROCEDURE — 1126F AMNT PAIN NOTED NONE PRSNT: CPT | Performed by: PSYCHIATRY & NEUROLOGY

## 2025-02-06 RX ORDER — TIZANIDINE 2 MG/1
2 TABLET ORAL 2 TIMES DAILY
Qty: 90 TABLET | Refills: 3 | Status: SHIPPED | OUTPATIENT
Start: 2025-02-06 | End: 2026-02-01

## 2025-02-06 RX ORDER — ERGOCALCIFEROL 1.25 MG/1
1.25 CAPSULE ORAL
Qty: 4 CAPSULE | Refills: 11 | Status: SHIPPED | OUTPATIENT
Start: 2025-02-09 | End: 2026-02-09

## 2025-02-06 ASSESSMENT — PAIN SCALES - GENERAL: PAINLEVEL_OUTOF10: 0-NO PAIN

## 2025-02-06 NOTE — PATIENT INSTRUCTIONS
Your most recent MRI was stable without new or active lesions, which is great.     Your blood work from June was good and does not show unexpected side effects from zeposia. Will repeat the blood work again today.     Please increase tizanidine to 2 mg twice daily.     Please continue vitamin D 88995 units weekly.     I will order PT again for you. Please discuss a bioness electric device instead of the foot brace.     I will order massage therapy for you.     Repeat brain MRI in June then follow up again with me.     Thank you for visiting the clinic today    Cooper Escalona MD

## 2025-02-06 NOTE — PROGRESS NOTES
Chief Complaint  MS   Neurologic Evaluation.      History of Present Illness     Neuro - Immunology   Date of onset: 2014   Date of diagnosis: 05/03/2022   Last MRI brain: 12/2024   Last MRI cervical: 1/2023   Last MRI thoracic: 1/2023   Last CBC (Complete Blood Count): 2/2023~   SPMS with progression.      Disease Course at Onset: RR.   Disease Course Now: progressive without relapses.   Current DMT (Disease Modifying Therapies): zeposia since 2/2024  Previous DMT (Disease Modifying Therapies): Ocrevus (from 8/2022 to 2/2024 then switched to zeposia due to recurrent infections).   CSF (Cerebrospinal Fluid): not done.   JCV (Mejia Cunningham Virus): Positive 5/2022, index 3.2.   VZV (Varicella Zoster Virus): Positive 5/2022.   Hep Panel: negative 5/2022.   NMO (Neuromyelitis Optica): negative 12/2021.   MOG (Myelin Oligodendrocyte Glycoprotein): negative 2/2021.     Narrative HPI:   This is a 61 yo left handed AAF with hx of brief remote smoking who presents for MS follow up:     Interval hx:  Continues to have episodes of right eye redness with normal vision while on zeposia with normal ophtho eval per her report. Reports some SOB and will be seeing her PCP for it soon. Denies prior hx of asthma. Continues to have occasional painful spasms in the LLE despite going back on tizanidine. The second AFO also did not work because it causes pain when she wears it. Walking speed improved. Labs from June were good and MRI in December was stable. She is planning to apply for disability.       MS Symptom Review:   Weakness:~acute BLE weakness in 2014 followed by progressive weakness since 2018.   Sensory changes:~BLE tingling.   Incoordination:   Falling:~once every 3 months because of tripping over the left foot.   Gait Change:~using a cane since 2018.   No painful vision loss.   No double vision.   Vertigo:~positional only resolved with Ebly.   No facial/bulbar weakness.~but had an episode of severe pain in the left cheek  and inability to move the jaw to talk or eat thought to be TMJ.   No Lhermitte's.   Bladder/bowel dysfunction:~urgency with occasional urge incontinence.   Spasticity:~yes legs feel stiff.   No tonic spasms.   Tremors:~yes left hand, action tremor.   RLS (Restless Leg Syndrome):~yes both legs since 2021.   Dystonia:~bilateral food dystonia increased arching of the foot with inversion, both feet do not get affected simultaneously, in the morning, 2-3 times a week, painful, lasting for a few seconds.   Other movement disorders:~R>L leg jerking, painless, rare, no triggers, no impact.   No heat sensitivity.   Fatigue:   No depression.   Anxiety:   No cognitive changes.   DMT (Disease Modifying Therapies): zeposia since 2/2024.   Vitamin D: was on vitD 67079 units weekly but ran out.   Symptomatic:~Myrbetriq for urinary symptoms. Stopped tizanidine     Review of Systems  All systems reviewed and are negative except as mentioned in the HPI.        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)   · Ataxia (781.3) (R27.0)   · Cervical myelopathy (721.1) (G95.9)   · Dizziness (780.4) (R42)   · Foot pain, bilateral (729.5) (M79.671,M79.672)   · Immunosuppression due to drug therapy (V58.69) (D84.821,Z79.899)   · Impacted cerumen of both ears (380.4) (H61.23)   · Metatarsalgia of both feet (726.70) (M77.41,M77.42)   · Multiple sclerosis, secondary progressive (340) (G35)   · Otalgia, bilateral (388.70) (H92.03)   · Pain in both feet (729.5) (M79.671,M79.672)   · Paresthesia of both feet (782.0) (R20.2)   · Positive GAURI (antinuclear antibody) (795.79) (R76.8)   · Positive colorectal cancer screening using Cologuard test (787.7) (R19.5)   · Screening for cervical cancer (V76.2) (Z12.4)   · Sensation of fullness in both ears (388.8) (H93.8X3)   · Sensory urge incontinence (788.31) (N39.41)   · Subjective tinnitus, bilateral (388.31) (H93.13)   · TMJ pain dysfunction syndrome (524.69) (M26.629)   ·  Unsteadiness on feet (781.2) (R26.81)   · Urinary incontinence (788.30) (R32)   · Weakness of extremity (729.89) (R29.898)   · Well woman exam (V72.31) (Z01.419)     Past Medical History  Problems    · Multiple sclerosis, secondary progressive (340) (G35)   · Sensory urge incontinence (788.31) (N39.41)     Surgical History  Problems    · History of Arm surgery   · History of Oral surgery   · History of Tubal ligation     Family History  Mother    · Family history of rheumatoid arthritis (V17.7) (Z82.61)     Social History  Problems    · Always uses seat belt   · Former smoker (V15.82) (Z87.891)   · No alcohol use     Allergies  Medication    · Penicillins   Hives; burning sensation; Itching; Updated By: Shahab Bay; 5/16/2022 9:27:57 AM   · sulfa   Hives; burning sensation; Itching; Updated By: Shahab Bay; 5/16/2022 9:27:57 AM   · Benadryl CREA   skin blistered up; Recorded By: Shahab Bay; 5/17/2022 9:23:44 AM   · Cipro   Nausea; Updated By: Shahab Bay; 5/16/2022 9:27:57 AM   · codeine   Nausea; Updated By: Shahab Bay; 5/16/2022 9:27:57 AM   · Flagyl   Nausea; Updated By: Shahab Bay; 5/16/2022 9:27:57 AM          Physical Exam  Multiple sclerosis Functional Composite (MSFC):     25 foot walk: 20 s compared to 25.3 s compared to 14.6 s compared to 14.8 s compared to 16.8 s compared to 20 s compared to 24 s     9-PEG-HOLE:      Right not done   Left not done      PST test: not done   Level of education:  Raw Score:  Adjusted Score                                                                                                       Estimated EDSS:     Constitutional: General appearance: no acute distress   Mental status: The patient was in no distress, alert, interactive and cooperative. Affect is appropriate.   Orientation: oriented to person,~oriented to place~and~oriented to time.   Memory: recent memory intact~and~remote memory intact.   Attention: normal attention span~and~normal  concentrating ability.   Language: normal comprehension~and~no difficulty naming common objects.   Fund of knowledge: Patient displays adequate knowledge of current events,~adequate fund of knowledge regarding past history~and~adequate fund of knowledge regarding vocabulary.   Cranial nerve II: Visual fields full to confrontation.~red desaturation left eye. no RAPD but left pupil sluggish in reaction.   Cranial nerves III, IV, and VI: Pupils round, equally reactive to light; no ptosis. EOMs intact. No nystagmus.~no Daren.   Cranial Nerve V: Facial sensation intact bilaterally.   Cranial nerve VII: Normal and symmetric facial strength.   Cranial nerve VIII: Hearing is intact bilaterally to finger rub / whisper.   Cranial nerves IX and X: Palate elevates symmetrically.   Cranial nerve XI: Shoulder shrug and neck rotation strength are intact.   Cranial nerve XII: Tongue midline with normal strength.   Motor: Muscle bulk was normal in both upper and lower extremities.~moderate spasticity BLE more on the left.~3-4/5 LLE, 4+/5 RLE.~moderate postural/action tremor of the left hand.   Deep Tendon Reflexes: left biceps 3+,~right biceps 3+,~left triceps 2+,~right triceps 2+,~left brachioradialis 3+,~right brachioradialis 3+,~left patella 4+,~right patella 4+,~left ankle jerk 3+,~right ankle jerk 3+   Sensory Exam:. left hemicord syndrome and distal reduction of vibration sense.   Coordination: There is no limb dystaxia and rapid alternating movements are intact.   Gait:. left cirumduction with a walker.      Scores and Scales     Pyramidal Functions: (3) Mild to moderate paraparesis or hemiparesis (detectable weakness but most function sustained for short periods, fatigue a problem); severe monoparesis (almost no function).   Cerebellar Functions: (3) Moderate truncal or limb ataxia (tremor or clumsy movements interfere with function in all spheres).   Brainstem Functions: (0) Normal.      Sensory Function: Moderate decrease in  touch or pain or position sense, and/or moderate decrease in vibration in one or two limbs; or vibratory (c/s figure writing) decrease alone in three or four limbs.                     Bowel and Bladder Function: (3) Frequent urinary incontinence.      Visual Function: (0) Normal.     Provider Impressions     Neuro - Immunology Assessment: This is a 63 year old AA,~left - handed female, with PMH of: remote brief smoking who presents: with acute transient BLE weakness in 2014 followed by gradual and progressive BLE weakness with gait and bladder dysfunction since 2018.   The Neurological Exam showed: BLE spastic paraparesis, LUE postural/action tremor, sluggish left pupile, and red desaturation OS.   MRI Showed: non-enhancing typical demyelinating plaques in periventricular and cervical locations with heaviest burden in the cervical cord.   CSF (Cerebrospinal Fluid): not done and not needed.   OCT/VEP: not done.   MS Mimics: ruled out including negative AQP4 and MOG.   The Overall Picture is Suggestive of: SPMS with progression and without clinical activity. I'm unable to  radiological activity in the absence of longitudinal scans.   DMT (Disease Modifying Therapies): Ocrevus (started in 8/2022).         Interval Assessment:   12/5/2022: Started Ocrevus in 8/2022. Tolerated first two doses well. Due for next dose in March 2023. No labs or MRI to review since last visit. Taking Vit D weekly. Started on Myrbetriq by urology for her urinary symptoms. Started using AFO for her dropfoot, which she says helps the foot clear the floor. Using a walker to ambulate. 25ft walk in 20s today, with walker. Moderate spasticity in her LEs on exam. will start baclofen. will move MRI to March     3/6/2023: Continues to tolerate Ocrevus well. First maintenance dose of Ocrevus was 2/27/23. Bcells at 3%, will monitor for early repopulation in future. Wasnt able to tolerate baclofen due to LE weakness and drowsiness. Presented to the  ED in 1/23 with LE neuropathic pain. Was given 7 days of gabapentin, which was helpful. Had MRI brain/spine in ED, which was unremarkable for new or active lesions but showed multiple disc prolapse on lumbar MRI. Neuropathic pain has since resolved without gabapentin. Still having some spasticity in both her LE so will try tizanidine. Hasnt been using her AFO recently. 25ft walk of 16.8s improved from last visit.     The patient is presenting with left foot drop and left knee genu recurvatum, leading to increased fall risk and increased energy expenditure during functional mobility. The patient is ambulatory and will benefit from utilization of an AFO on the left side at all times during ambulation to increase foot clearance and reduce fall risk during ambulation.     08/17/2023: No new neurological symptoms. had two back to back UTIs in June and July. The last UTI required hospital admission for one day. she hasn't been taking her myrbetriq regularly. New MRI without any new or active lesions. 25-F-W keeps improving. will have her follow up with urology given recurrent UTIs. she hasn't started tizanidine yet and should start now.     2/15/2024: Had another hospital admission for a perirectal abscess in November. She also felt weaker after her latest ocrevus infusion. She no longer wants to take ocrevus because of the recurrent infections. She wants to try something with less infection risk. Notably, her last IGG level in August was normal (1810) and last ALC was also normal (1.05). she reports left knee pain that responded to a local steroid injection. I discussed that all other DMTs may also increase the risk of infection although possibly less so than ocrevus. After a prolonged discussion, we mutually agreed to switch to zeposia after EKG and updated blood work. Had some benefit from tizanidine.     8/15/2024: Switched to zeposia in February due to recurrent infections with ocrevus. Developed two episodes of  right eye redness with normal vision after starting zeposia that self-resolved after a few days. No other side effects. Labs from June show mild leukopenia (3.7) and lymphopenia (0.8) with normal liver enzymes. Stopped tiznidine and continues to have occasional spasms in the LLE. AFO did not fit her and PT recommends a Italian knee cage instead. Walking speed slowed down. MRI in April was stable. She will apply for disability. Will resume tizanidine and vitamin D.    2/6/2025: Continues to have episodes of right eye redness with normal vision while on zeposia with normal ophtho eval per her report. Reports some SOB and will be seeing her PCP for it soon. Denies prior hx of asthma. Continues to have occasional painful spasms in the LLE despite going back on tizanidine. The second AFO also did not work because it causes pain when she wears it. Walking speed improved. Labs from June were good and MRI in December was stable. She is planning to apply for disability. Will double her tizanidine and send her to PT for bioness eval.         Plan: SPMS was discussed with the patient (and family if present) in detail including pathogenesis, clinical picture, complications, course, prognosis, monitoring strategy, and management options. All questions answered.   Acute Relapse Management: not indicated.   DMT (Disease Modifying Therapies): continue zeposia  Will Order Blood Work For: CBC diff, CMP, and IgG  Will Order MRI of: the brain WWO to be done in June of 2025 for monitoring. If stable, we will switch to yearly monitoring.   Symptomatic Management: increase tizanidine. continue gabapentin. may need ampyra in future.   Vitamin D: continue 09847 units weekly.   Smoking: non smoker.   PT/OT: continue. Try bioness. Ordered massage therapy per her request.   Instructions: Keep an active lifestyle and develop exercise routine as tolerated. Recommend a balanced healthy diet. Avoid excessive amounts of salt, carbohydrates, fat,  and red meat. Increase intake of fruits, vegetables, and white meat.   Follow-Up: in June after next MRI.     Cooper Escalona MD

## 2025-02-14 ENCOUNTER — SPECIALTY PHARMACY (OUTPATIENT)
Dept: PHARMACY | Facility: CLINIC | Age: 65
End: 2025-02-14

## 2025-02-14 DIAGNOSIS — G35 MULTIPLE SCLEROSIS (MULTI): ICD-10-CM

## 2025-02-14 PROCEDURE — RXMED WILLOW AMBULATORY MEDICATION CHARGE

## 2025-02-17 ENCOUNTER — PHARMACY VISIT (OUTPATIENT)
Dept: PHARMACY | Facility: CLINIC | Age: 65
End: 2025-02-17
Payer: COMMERCIAL

## 2025-03-07 ENCOUNTER — APPOINTMENT (OUTPATIENT)
Dept: INTEGRATIVE MEDICINE | Facility: CLINIC | Age: 65
End: 2025-03-07
Payer: MEDICARE

## 2025-03-07 PROCEDURE — MASSG60 MASSAGE 60 MINUTES: Performed by: MASSAGE THERAPIST

## 2025-03-07 NOTE — PROGRESS NOTES
Massage Therapy Visit:     Chery Emery was referred by Dr. Cooper Escalona.    Condition of Client Subjective :  Patient ID: Chery Emery is a 64 y.o. female who presents for reason for visit of Muscle tension release and Relaxation massage.  HPI    Session Information  Visit Type: New patient  Description of present complaint: Discomfort, Stress, Muscle tension, Chronic pain, Range of motion (ROM), Gait issues, Fatigue    Review of Systems    Objective   Pre-treatment Assessment  Condition of Client Note: First session.  MS diagnosis, walker used for mobility, balance.  Tightness, discomfort felt mainly on L leg, L shoulder.. whole body massage requested.    Physical Exam    Actions Assessment/Plan :    Massage Treatment  Patient Position: Table, Supine  Positioning Assistance: Did not need assistance, Pillow(s)/bolster under knees while supine  Massage Technique: Therapeutic massage, Nurturing touch, Superficial fascial release, Stretching, Mobilization, Soft tissue mobilization, Relaxation massage  Pressure Scale: 1 - Light pressure, 2 - Mild pressure, 3 - Medium pressure  Action Note: Upper body, kneading, stripping, palming, effleurage, petrissage, cross fiber, on Cervicals, Occipitals, Platysma, SCM, SITS, Levator Scapulae, Trapezius, Rhomboids, upper Pectoralis, Deltoids.  UE, stretching, cross fiber, palming, kneading, effleurage, on Deltoids, Biceps, Triceps, forearm muscles, hand muscles.  LE, kneading, stripping, palming, knuckle, effleurage, petrissage, cross fiber, stretching, on Vastus group, TFL, Hamstrings, Sartorius, Gracilis, Gastrocnemius, Soleus, Tibialis.    Response:  Post-treatment Assessment  Patient Noted Improvement of the Following Symptoms: Muscle tension, ROM    Evaluation:   Massage Therapy Evaluation / Recommendation(s) / Follow-up  Post-Treatment Recommendation: Increase hydration, maintain movement.  Follow-up: Follow up as needed, 4-6 weeks

## 2025-03-13 ENCOUNTER — SPECIALTY PHARMACY (OUTPATIENT)
Dept: NEUROLOGY | Facility: HOSPITAL | Age: 65
End: 2025-03-13
Payer: MEDICARE

## 2025-03-13 PROCEDURE — RXMED WILLOW AMBULATORY MEDICATION CHARGE

## 2025-03-13 RX ORDER — GABAPENTIN 100 MG/1
100 CAPSULE ORAL NIGHTLY
COMMUNITY

## 2025-03-13 NOTE — PROGRESS NOTES
[facility] Clinical Note  Patient Reassessment     Introduction  Chery Emery is a 64 y.o. female who is on the specialty pharmacy service for management of: Multiple Sclerosis Core.      [facility] supplied medication: Zeposia 0.92 mg capsules    Duration of therapy: Maintenance    The most recent encounter visit with the referring prescriber Cooper Escalona MD on 2/6/2025 was reviewed.  Pharmacy will continue to collaborate in the care of this patient with the referring prescriber.    Discussion  Chery was contacted on 3/13/2025 at 1:00 PM for a pharmacy visit with encounter number [id number] from:   [facility]  [address] [facility] [address]  [facility] [address]  Dept: [phone]  Dept Fax: [phone]  Loc: [phone]  Chery consented to a/an Telephone visit, which was performed.    Efficacy  Patient has developed new symptoms of condition: No  Patient/caregiver feels medication is affecting the disease state: MS is stable    Goals  Provided education on goals and possible outcomes of therapy:  Adherence with therapy  Timely completion of appropriate labs  Timely and appropriate follow up with provider  Identify and address medication interactions with presciption medications, OTC medications and supplements  Optimize or maintain quality of life  Neurology- MS/NMOSD: No new or active MRI lesions  No clinical relapses  Absence of confirmed disability progression   Patient has documented target(s) for goals of therapy: No    Tolerance  Patient has experienced side effects from this medication: No  Changes to current therapy regimen: No    The follow-up timeline was discussed. Every person responds to and reacts to therapy differently. Patient should be assessed for efficacy and tolerability in approximately: annually    Adherence  Patient Information  Informant: Self (Patient)  Demonstrates Understanding of Importance of  Adherence: Yes  Does the patient have any barriers to self-administration (including physical and mental?): No  Support Network for Adherence: Family Member  Adherence Tools Used: Medication list  Medication Information  Medication: ozanimod hydrochloride (Zeposia)  Patient Reported Missed Doses in the Last 4 Weeks: 0  Estimated Medication Adherence Level: %  Adherence Estimation Source: Claims history  Barriers to Adherence: No Problems identified   The importance of adherence was discussed and patient/caregiver was advised to take the medication as prescribed by their provider. Encouraged patient/caregiver to call physician's office or specialty pharmacy if they have a question regarding a missed dose.    General Assessment  Changes to home medications, OTCs or supplements: Yes - she increased tizanidine to 2mg twice daily and started gabapentin 100 mg nightly.  Current Outpatient Medications   Medication Sig Dispense Refill    acetaminophen (Tylenol Extra Strength) 500 mg tablet Take 1 tablet (500 mg) by mouth.      ergocalciferol (Vitamin D-2) 1.25 MG (43017 UT) capsule Take 1 capsule (1,250 mcg) by mouth 1 (one) time per week. 4 capsule 11    gabapentin (Neurontin) 100 mg capsule Take 1 capsule (100 mg) by mouth once daily at bedtime.      ibuprofen (Motrin IB) 200 mg tablet Take 1 tablet by mouth 2 times a day.      mirtazapine (Remeron) 7.5 mg tablet Take 1 tablet (7.5 mg) by mouth once daily at bedtime.      ozanimod (Zeposia) 0.92 mg capsule Take 1 capsule (0.92 mg) by mouth once daily. 30 capsule 5    pantoprazole (ProtoNix) 20 mg EC tablet Take 1 tablet (20 mg) by mouth once daily.      tiZANidine (Zanaflex) 2 mg tablet Take 1 tablet (2 mg) by mouth 2 times a day. 90 tablet 3     No current facility-administered medications for this visit.     Reported new allergies: No  Reported new medical conditions: No  Additional monitoring reviewed: Neurology - MS/NMOSD - CBC-diff:   Lab Results   Component  Value Date    WBC 3.7 (L) 06/12/2024    RBC 4.85 06/12/2024    HGB 14.5 06/12/2024    HCT 45.1 06/12/2024    MCV 93 06/12/2024    MCHC 32.2 06/12/2024     06/12/2024    RDW 13.4 06/12/2024    NEUTOPHILPCT 62.6 06/12/2024    IGPCT 0.5 06/12/2024    LYMPHOPCT 21.6 06/12/2024    MONOPCT 14.3 06/12/2024    EOSPCT 0.5 06/12/2024    BASOPCT 0.5 06/12/2024    NEUTROABS 2.32 06/12/2024    LYMPHSABS 0.80 (L) 06/12/2024    MONOSABS 0.53 06/12/2024    EOSABS 0.02 06/12/2024    BASOSABS 0.02 06/12/2024    and LFTs and bilirubin:   Lab Results   Component Value Date    ALT 10 06/12/2024    AST 16 06/12/2024    ALKPHOS 44 06/12/2024    BILITOT 0.6 06/12/2024     Is laboratory follow up needed? No    Advised to contact the pharmacy if there are any changes to the patient's medication list, including prescriptions, OTC medications, herbal products, or supplements.    Impression/Plan  This patient has not been identified as high risk due to Lack of high risk qualifiers.  The following action was taken: N/A.    QOL/Patient Satisfaction  Rate your quality of life on scale of 1-10: 5  Rate your satisfaction with  Specialty Pharmacy on scale of 1-10: 10 - Completely satisfied    Provided contact information ([phone]) for [facility] and reviewed dispensing process, refill timeline and patient management follow up. Confirmed understanding of education conducted during assessment. All questions and concerns were addressed and patient/caregiver was encouraged to reach out for additional questions or concerns.    Based on the patient's diagnosis, medication list, progress towards goals, adherence, tolerance, and medication list, medication remains appropriate: Therapy remains appropriate (I attest)    Wilner Galloway, BettieD

## 2025-03-14 ENCOUNTER — PHARMACY VISIT (OUTPATIENT)
Dept: PHARMACY | Facility: CLINIC | Age: 65
End: 2025-03-14
Payer: COMMERCIAL

## 2025-04-15 ENCOUNTER — SPECIALTY PHARMACY (OUTPATIENT)
Dept: PHARMACY | Facility: CLINIC | Age: 65
End: 2025-04-15

## 2025-05-09 ENCOUNTER — SPECIALTY PHARMACY (OUTPATIENT)
Dept: PHARMACY | Facility: CLINIC | Age: 65
End: 2025-05-09

## 2025-06-11 ENCOUNTER — SPECIALTY PHARMACY (OUTPATIENT)
Dept: NEUROLOGY | Facility: HOSPITAL | Age: 65
End: 2025-06-11
Payer: MEDICARE

## 2025-06-11 NOTE — PROGRESS NOTES
I spoke with Chery this afternoon to complete a reassessment of Zeposia, which she has been receiving from  Specialty Pharmacy. Upon speaking with Chery, she informed me that she stopped taking Zeposia a couple of months ago as she was having worsening anxiety and attributed it to the medication. I advised her that it is important from an MS activity standpoint that she remains on a medication for treatment of MS. I advised that she call Dr. Escalona's office to set up a follow up visit so that she and Dr. Escalona can discuss an alternative DMT for her. She was agreeable to this plan and informed me that she had the number to call to get an appointment.    Wilner Galloway, PharmD, BCPS, [facility] [place]  Clinical [place]acy Specialist- Neurology/[facility]  Phone: [phone]  Fax: [phone]  06/11/25  2:53 PM